# Patient Record
Sex: FEMALE | Race: WHITE | NOT HISPANIC OR LATINO
[De-identification: names, ages, dates, MRNs, and addresses within clinical notes are randomized per-mention and may not be internally consistent; named-entity substitution may affect disease eponyms.]

---

## 2018-01-18 ENCOUNTER — RESULT REVIEW (OUTPATIENT)
Age: 48
End: 2018-01-18

## 2018-01-18 ENCOUNTER — APPOINTMENT (OUTPATIENT)
Dept: DERMATOLOGY | Facility: CLINIC | Age: 48
End: 2018-01-18
Payer: COMMERCIAL

## 2018-01-18 PROCEDURE — 99214 OFFICE O/P EST MOD 30 MIN: CPT | Mod: 25

## 2018-01-18 PROCEDURE — 56605 BIOPSY OF VULVA/PERINEUM: CPT

## 2018-01-18 PROCEDURE — 11100 BX SKIN SUBCUTANEOUS&/MUCOUS MEMBRANE 1 LESION: CPT | Mod: 59

## 2019-01-09 ENCOUNTER — RESULT REVIEW (OUTPATIENT)
Age: 49
End: 2019-01-09

## 2019-01-10 ENCOUNTER — APPOINTMENT (OUTPATIENT)
Dept: DERMATOLOGY | Facility: CLINIC | Age: 49
End: 2019-01-10
Payer: COMMERCIAL

## 2019-01-10 PROCEDURE — 11103 TANGNTL BX SKIN EA SEP/ADDL: CPT

## 2019-01-10 PROCEDURE — 99214 OFFICE O/P EST MOD 30 MIN: CPT | Mod: 25

## 2019-01-10 PROCEDURE — 11106 INCAL BX SKN SINGLE LES: CPT

## 2019-01-10 PROCEDURE — 12031 INTMD RPR S/A/T/EXT 2.5 CM/<: CPT | Mod: 59

## 2020-01-09 ENCOUNTER — APPOINTMENT (OUTPATIENT)
Dept: DERMATOLOGY | Facility: CLINIC | Age: 50
End: 2020-01-09
Payer: COMMERCIAL

## 2020-01-09 PROCEDURE — 99214 OFFICE O/P EST MOD 30 MIN: CPT | Mod: 25

## 2020-01-09 PROCEDURE — 17003 DESTRUCT PREMALG LES 2-14: CPT | Mod: 59

## 2020-01-09 PROCEDURE — 17000 DESTRUCT PREMALG LESION: CPT | Mod: 59

## 2020-01-09 PROCEDURE — 17110 DESTRUCTION B9 LES UP TO 14: CPT

## 2021-01-07 ENCOUNTER — APPOINTMENT (OUTPATIENT)
Dept: DERMATOLOGY | Facility: CLINIC | Age: 51
End: 2021-01-07
Payer: COMMERCIAL

## 2021-01-07 PROCEDURE — 99214 OFFICE O/P EST MOD 30 MIN: CPT | Mod: 25

## 2021-01-07 PROCEDURE — 17000 DESTRUCT PREMALG LESION: CPT | Mod: 59

## 2021-01-07 PROCEDURE — 17110 DESTRUCTION B9 LES UP TO 14: CPT

## 2021-01-07 PROCEDURE — 99072 ADDL SUPL MATRL&STAF TM PHE: CPT

## 2021-05-10 ENCOUNTER — TRANSCRIPTION ENCOUNTER (OUTPATIENT)
Age: 51
End: 2021-05-10

## 2021-05-28 ENCOUNTER — APPOINTMENT (OUTPATIENT)
Dept: OTOLARYNGOLOGY | Facility: CLINIC | Age: 51
End: 2021-05-28
Payer: COMMERCIAL

## 2021-05-28 VITALS
SYSTOLIC BLOOD PRESSURE: 130 MMHG | DIASTOLIC BLOOD PRESSURE: 77 MMHG | HEIGHT: 68 IN | WEIGHT: 198 LBS | HEART RATE: 69 BPM | TEMPERATURE: 96.2 F | BODY MASS INDEX: 30.01 KG/M2

## 2021-05-28 DIAGNOSIS — H92.09 OTALGIA, UNSPECIFIED EAR: ICD-10-CM

## 2021-05-28 PROCEDURE — 92557 COMPREHENSIVE HEARING TEST: CPT

## 2021-05-28 PROCEDURE — 92567 TYMPANOMETRY: CPT

## 2021-05-28 PROCEDURE — 99203 OFFICE O/P NEW LOW 30 MIN: CPT

## 2021-05-28 NOTE — PHYSICAL EXAM
[de-identified] : SLIGHT TENDERNESS [Normal] : mucosa is normal [Midline] : trachea located in midline position [de-identified] : MISSING TEETH

## 2021-05-28 NOTE — REVIEW OF SYSTEMS
[Seasonal Allergies] : seasonal allergies [Dizziness] : dizziness [Vertigo] : vertigo [Lightheadedness] : lightheadedness [Problem Snoring] : problem snoring [Throat Clearing] : throat clearing [Recent Weight Loss (___ Lbs)] : recent [unfilled] ~Ulb weight loss [Eyes Itch] : itching of the eyes [Chest Pain] : chest pain [Heartburn] : heartburn [Joint Pain] : joint pain [Depression] : depression [Easy Bruising] : a tendency for easy bruising [Negative] : Endocrine [Patient Intake Form Reviewed] : Patient intake form was reviewed [FreeTextEntry3] : watery eyes  [FreeTextEntry9] : muscle aches  [de-identified] : headache  [FreeTextEntry1] : daytime sleepiness

## 2021-07-20 ENCOUNTER — EMERGENCY (EMERGENCY)
Facility: HOSPITAL | Age: 51
LOS: 1 days | Discharge: HOME | End: 2021-07-20
Attending: EMERGENCY MEDICINE | Admitting: EMERGENCY MEDICINE
Payer: COMMERCIAL

## 2021-07-20 VITALS
HEART RATE: 91 BPM | RESPIRATION RATE: 20 BRPM | SYSTOLIC BLOOD PRESSURE: 121 MMHG | WEIGHT: 194.01 LBS | HEIGHT: 68 IN | TEMPERATURE: 98 F | DIASTOLIC BLOOD PRESSURE: 68 MMHG | OXYGEN SATURATION: 95 %

## 2021-07-20 VITALS
DIASTOLIC BLOOD PRESSURE: 69 MMHG | RESPIRATION RATE: 16 BRPM | SYSTOLIC BLOOD PRESSURE: 130 MMHG | OXYGEN SATURATION: 99 % | TEMPERATURE: 98 F | HEART RATE: 76 BPM

## 2021-07-20 DIAGNOSIS — Z90.710 ACQUIRED ABSENCE OF BOTH CERVIX AND UTERUS: Chronic | ICD-10-CM

## 2021-07-20 DIAGNOSIS — Z98.890 OTHER SPECIFIED POSTPROCEDURAL STATES: Chronic | ICD-10-CM

## 2021-07-20 DIAGNOSIS — Z86.018 PERSONAL HISTORY OF OTHER BENIGN NEOPLASM: Chronic | ICD-10-CM

## 2021-07-20 DIAGNOSIS — C51.9 MALIGNANT NEOPLASM OF VULVA, UNSPECIFIED: Chronic | ICD-10-CM

## 2021-07-20 LAB
ALBUMIN SERPL ELPH-MCNC: 3.9 G/DL — SIGNIFICANT CHANGE UP (ref 3.3–5)
ALP SERPL-CCNC: 127 U/L — HIGH (ref 30–120)
ALT FLD-CCNC: 36 U/L DA — SIGNIFICANT CHANGE UP (ref 10–60)
ANION GAP SERPL CALC-SCNC: 7 MMOL/L — SIGNIFICANT CHANGE UP (ref 5–17)
APPEARANCE UR: CLEAR — SIGNIFICANT CHANGE UP
AST SERPL-CCNC: 23 U/L — SIGNIFICANT CHANGE UP (ref 10–40)
BASOPHILS # BLD AUTO: 0.07 K/UL — SIGNIFICANT CHANGE UP (ref 0–0.2)
BASOPHILS NFR BLD AUTO: 0.7 % — SIGNIFICANT CHANGE UP (ref 0–2)
BILIRUB SERPL-MCNC: 0.3 MG/DL — SIGNIFICANT CHANGE UP (ref 0.2–1.2)
BILIRUB UR-MCNC: NEGATIVE — SIGNIFICANT CHANGE UP
BUN SERPL-MCNC: 11 MG/DL — SIGNIFICANT CHANGE UP (ref 7–23)
CALCIUM SERPL-MCNC: 10.2 MG/DL — SIGNIFICANT CHANGE UP (ref 8.4–10.5)
CHLORIDE SERPL-SCNC: 102 MMOL/L — SIGNIFICANT CHANGE UP (ref 96–108)
CO2 SERPL-SCNC: 31 MMOL/L — SIGNIFICANT CHANGE UP (ref 22–31)
COLOR SPEC: YELLOW — SIGNIFICANT CHANGE UP
CREAT SERPL-MCNC: 1.17 MG/DL — SIGNIFICANT CHANGE UP (ref 0.5–1.3)
DIFF PNL FLD: ABNORMAL
EOSINOPHIL # BLD AUTO: 0.13 K/UL — SIGNIFICANT CHANGE UP (ref 0–0.5)
EOSINOPHIL NFR BLD AUTO: 1.3 % — SIGNIFICANT CHANGE UP (ref 0–6)
GLUCOSE SERPL-MCNC: 91 MG/DL — SIGNIFICANT CHANGE UP (ref 70–99)
GLUCOSE UR QL: NEGATIVE MG/DL — SIGNIFICANT CHANGE UP
HCT VFR BLD CALC: 37.3 % — SIGNIFICANT CHANGE UP (ref 34.5–45)
HGB BLD-MCNC: 12.4 G/DL — SIGNIFICANT CHANGE UP (ref 11.5–15.5)
IMM GRANULOCYTES NFR BLD AUTO: 0.2 % — SIGNIFICANT CHANGE UP (ref 0–1.5)
KETONES UR-MCNC: ABNORMAL
LEUKOCYTE ESTERASE UR-ACNC: ABNORMAL
LIDOCAIN IGE QN: 135 U/L — SIGNIFICANT CHANGE UP (ref 73–393)
LYMPHOCYTES # BLD AUTO: 2.87 K/UL — SIGNIFICANT CHANGE UP (ref 1–3.3)
LYMPHOCYTES # BLD AUTO: 28.6 % — SIGNIFICANT CHANGE UP (ref 13–44)
MCHC RBC-ENTMCNC: 29.2 PG — SIGNIFICANT CHANGE UP (ref 27–34)
MCHC RBC-ENTMCNC: 33.2 GM/DL — SIGNIFICANT CHANGE UP (ref 32–36)
MCV RBC AUTO: 88 FL — SIGNIFICANT CHANGE UP (ref 80–100)
MONOCYTES # BLD AUTO: 0.89 K/UL — SIGNIFICANT CHANGE UP (ref 0–0.9)
MONOCYTES NFR BLD AUTO: 8.9 % — SIGNIFICANT CHANGE UP (ref 2–14)
NEUTROPHILS # BLD AUTO: 6.05 K/UL — SIGNIFICANT CHANGE UP (ref 1.8–7.4)
NEUTROPHILS NFR BLD AUTO: 60.3 % — SIGNIFICANT CHANGE UP (ref 43–77)
NITRITE UR-MCNC: NEGATIVE — SIGNIFICANT CHANGE UP
NRBC # BLD: 0 /100 WBCS — SIGNIFICANT CHANGE UP (ref 0–0)
PH UR: 5 — SIGNIFICANT CHANGE UP (ref 5–8)
PLATELET # BLD AUTO: 317 K/UL — SIGNIFICANT CHANGE UP (ref 150–400)
POTASSIUM SERPL-MCNC: 3.5 MMOL/L — SIGNIFICANT CHANGE UP (ref 3.5–5.3)
POTASSIUM SERPL-SCNC: 3.5 MMOL/L — SIGNIFICANT CHANGE UP (ref 3.5–5.3)
PROT SERPL-MCNC: 7.7 G/DL — SIGNIFICANT CHANGE UP (ref 6–8.3)
PROT UR-MCNC: 15 MG/DL
RBC # BLD: 4.24 M/UL — SIGNIFICANT CHANGE UP (ref 3.8–5.2)
RBC # FLD: 12.1 % — SIGNIFICANT CHANGE UP (ref 10.3–14.5)
SODIUM SERPL-SCNC: 140 MMOL/L — SIGNIFICANT CHANGE UP (ref 135–145)
SP GR SPEC: 1.02 — SIGNIFICANT CHANGE UP (ref 1.01–1.02)
UROBILINOGEN FLD QL: 4 MG/DL
WBC # BLD: 10.03 K/UL — SIGNIFICANT CHANGE UP (ref 3.8–10.5)
WBC # FLD AUTO: 10.03 K/UL — SIGNIFICANT CHANGE UP (ref 3.8–10.5)

## 2021-07-20 PROCEDURE — 74177 CT ABD & PELVIS W/CONTRAST: CPT | Mod: 26,ME

## 2021-07-20 PROCEDURE — 74177 CT ABD & PELVIS W/CONTRAST: CPT

## 2021-07-20 PROCEDURE — 80053 COMPREHEN METABOLIC PANEL: CPT

## 2021-07-20 PROCEDURE — 81001 URINALYSIS AUTO W/SCOPE: CPT

## 2021-07-20 PROCEDURE — 96360 HYDRATION IV INFUSION INIT: CPT | Mod: XU

## 2021-07-20 PROCEDURE — 36415 COLL VENOUS BLD VENIPUNCTURE: CPT

## 2021-07-20 PROCEDURE — 99284 EMERGENCY DEPT VISIT MOD MDM: CPT | Mod: 25

## 2021-07-20 PROCEDURE — 83690 ASSAY OF LIPASE: CPT

## 2021-07-20 PROCEDURE — G1004: CPT

## 2021-07-20 PROCEDURE — 85025 COMPLETE CBC W/AUTO DIFF WBC: CPT

## 2021-07-20 PROCEDURE — 99284 EMERGENCY DEPT VISIT MOD MDM: CPT

## 2021-07-20 RX ORDER — SODIUM CHLORIDE 9 MG/ML
1000 INJECTION INTRAMUSCULAR; INTRAVENOUS; SUBCUTANEOUS ONCE
Refills: 0 | Status: COMPLETED | OUTPATIENT
Start: 2021-07-20 | End: 2021-07-20

## 2021-07-20 RX ADMIN — SODIUM CHLORIDE 1000 MILLILITER(S): 9 INJECTION INTRAMUSCULAR; INTRAVENOUS; SUBCUTANEOUS at 21:05

## 2021-07-20 RX ADMIN — SODIUM CHLORIDE 1000 MILLILITER(S): 9 INJECTION INTRAMUSCULAR; INTRAVENOUS; SUBCUTANEOUS at 19:55

## 2021-07-20 NOTE — ED PROVIDER NOTE - NSFOLLOWUPINSTRUCTIONS_ED_ALL_ED_FT
Duodenitis    WHAT YOU NEED TO KNOW:    Duodenitis is inflammation or irritation of the duodenum. The duodenum is the first part of the small intestine, just below your stomach.    Abdominal Organs         DISCHARGE INSTRUCTIONS:    Return to the emergency department if:   •You have severe abdominal pain.      •You have bloody or black, tarry bowel movements or vomit.      Call your doctor if:   •You have new or worsening symptoms, even after treatment.      •You have questions or concerns about your condition or care.      Medicines:   •Medicines may be given to treat an infection or to control stomach acid.      •Take your medicine as directed. Contact your healthcare provider if you think your medicine is not helping or if you have side effects. Tell him of her if you are allergic to any medicine. Keep a list of the medicines, vitamins, and herbs you take. Include the amounts, and when and why you take them. Bring the list or the pill bottles to follow-up visits. Carry your medicine list with you in case of an emergency.      Follow up with your healthcare provider as directed: You may need ongoing tests or treatment. Write down your questions so you remember to ask them during your visits.    Do not smoke: Nicotine and other chemicals in cigarettes and cigars can cause blood vessel and lung damage. Ask your healthcare provider for information if you currently smoke and need help to quit. E-cigarettes or smokeless tobacco still contain nicotine. Talk to your healthcare provider before you use these products.    Limit or do not drink alcohol: Alcohol can make your duodenitis worse. Talk to your healthcare provider if you need help to stop drinking.    Keep batteries and similar objects out of the reach of children: Babies often put items in their mouths to explore them. Button batteries are easy to swallow and can cause serious damage. Keep the battery covers of electronic devices such as remote controls taped closed. Store all batteries and toxic materials where children cannot get to them. Use childproof locks to keep children away from dangerous materials.    Manage or prevent duodenitis:   •Do not take NSAIDs or aspirin unless directed. These and similar medicines can cause irritation. It may help to take NSAIDs with food, but you may not be able to take them at all.      •Do not eat foods that cause irritation. Foods such as oranges and salsa can cause burning or pain. Eat a variety of healthy foods. Examples include fruits (not citrus), vegetables, low-fat dairy products, beans, whole-grain breads, and lean meats and fish. Try to eat small meals, and drink water with your meals. Do not eat for at least 3 hours before you go to bed.  Healthy Foods              © Copyright [employer] 2021

## 2021-07-20 NOTE — ED ADULT NURSE NOTE - NSIMPLEMENTINTERV_GEN_ALL_ED
Implemented All Universal Safety Interventions:  [place] to call system. Call bell, personal items and telephone within reach. Instruct patient to call for assistance. Room bathroom lighting operational. Non-slip footwear when patient is off stretcher. Physically safe environment: no spills, clutter or unnecessary equipment. Stretcher in lowest position, wheels locked, appropriate side rails in place.

## 2021-07-20 NOTE — ED ADULT NURSE NOTE - PMH
GERD (gastroesophageal reflux disease)    Hyperlipidemia    PTSD (post-traumatic stress disorder)

## 2021-07-20 NOTE — ED ADULT NURSE NOTE - PSH
3
H/O abdominal hysterectomy  partial  History of lumpectomy of right breast    History of uterine fibroid  removal  Vulvar cancer

## 2021-07-20 NOTE — ED ADULT NURSE NOTE - ABDOMEN
GOAL: Pt will increase global strength in order to improve function mobility within 2 weeks.
soft/nondistended/tender

## 2021-07-20 NOTE — ED ADULT NURSE NOTE - OBJECTIVE STATEMENT
Patient presents c/o left abdominal pains for 3 days described as soreness. The pain increases with deep inspiration Patient presents c/o left abdominal pains for 3 days described as soreness. The pain increases with deep inspiration. States decreased appetite, denies any nausea or vomiting but has experienced a few episodes of diarrhea today.

## 2021-07-20 NOTE — ED PROVIDER NOTE - PATIENT PORTAL LINK FT
You can access the FollowMyHealth Patient Portal offered by [facility] by registering at the following website: [email or web address]. By joining [employer]s FollowMyHealth portal, you will also be able to view your health information using other applications (apps) compatible with our system.

## 2021-07-20 NOTE — ED PROVIDER NOTE - OBJECTIVE STATEMENT
50yo female who presents with abd pain for 4 days. pt c/o LLQ pain, constant non radiating no nausea or vomiting no fever, chills, pt did not take anything for the pain

## 2021-09-01 ENCOUNTER — RESULT REVIEW (OUTPATIENT)
Age: 51
End: 2021-09-01

## 2021-09-12 ENCOUNTER — APPOINTMENT (OUTPATIENT)
Dept: DISASTER EMERGENCY | Facility: CLINIC | Age: 51
End: 2021-09-12

## 2021-09-12 DIAGNOSIS — Z01.818 ENCOUNTER FOR OTHER PREPROCEDURAL EXAMINATION: ICD-10-CM

## 2021-09-12 PROBLEM — E78.5 HYPERLIPIDEMIA, UNSPECIFIED: Chronic | Status: ACTIVE | Noted: 2021-07-20

## 2021-09-12 PROBLEM — K21.9 GASTRO-ESOPHAGEAL REFLUX DISEASE WITHOUT ESOPHAGITIS: Chronic | Status: ACTIVE | Noted: 2021-07-20

## 2021-09-12 PROBLEM — F43.10 POST-TRAUMATIC STRESS DISORDER, UNSPECIFIED: Chronic | Status: ACTIVE | Noted: 2021-07-20

## 2021-09-13 LAB — SARS-COV-2 N GENE NPH QL NAA+PROBE: NOT DETECTED

## 2021-11-30 ENCOUNTER — APPOINTMENT (OUTPATIENT)
Dept: PULMONOLOGY | Facility: CLINIC | Age: 51
End: 2021-11-30
Payer: COMMERCIAL

## 2021-11-30 VITALS
HEART RATE: 79 BPM | OXYGEN SATURATION: 98 % | WEIGHT: 198 LBS | HEIGHT: 68 IN | SYSTOLIC BLOOD PRESSURE: 129 MMHG | DIASTOLIC BLOOD PRESSURE: 76 MMHG | BODY MASS INDEX: 30.01 KG/M2 | RESPIRATION RATE: 16 BRPM

## 2021-11-30 DIAGNOSIS — I73.00 RAYNAUD'S SYNDROME W/OUT GANGRENE: ICD-10-CM

## 2021-11-30 DIAGNOSIS — Z87.891 PERSONAL HISTORY OF NICOTINE DEPENDENCE: ICD-10-CM

## 2021-11-30 DIAGNOSIS — Z87.39 PERSONAL HISTORY OF OTHER DISEASES OF THE MUSCULOSKELETAL SYSTEM AND CONNECTIVE TISSUE: ICD-10-CM

## 2021-11-30 DIAGNOSIS — F51.5 NIGHTMARE DISORDER: ICD-10-CM

## 2021-11-30 PROCEDURE — 99204 OFFICE O/P NEW MOD 45 MIN: CPT

## 2021-11-30 NOTE — PHYSICAL EXAM
[No Acute Distress] : no acute distress [Low Lying Soft Palate] : low lying soft palate [Enlarged Base of the Tongue] : enlarged base of the tongue [Normal Appearance] : normal appearance [Neck Circumference: ___] : neck circumference: [unfilled] [No Neck Mass] : no neck mass [III] : Mallampati Class: III [TextBox_2] : obese

## 2021-11-30 NOTE — DISCUSSION/SUMMARY
[FreeTextEntry1] : CHRISTINE\par PTSD related nightmares\par Obesity\par Note : poor sleep makes it likely HST would underestimate CHRISTINE\par            Alpha blocker excludes WatchPat study  \par            TMJ makes oral appliance option less tenable \par PST is the most appropriate test

## 2021-11-30 NOTE — HISTORY OF PRESENT ILLNESS
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

## 2021-11-30 NOTE — CONSULT LETTER
[Dear  ___] : Dear  [unfilled], [Consult Letter:] : I had the pleasure of evaluating your patient, [unfilled]. [Please see my note below.] : Please see my note below. [Consult Closing:] : Thank you very much for allowing me to participate in the care of this patient.  If you have any questions, please do not hesitate to contact me. [DrKike  ___] : Dr. LAROSE

## 2021-12-10 ENCOUNTER — OUTPATIENT (OUTPATIENT)
Dept: OUTPATIENT SERVICES | Facility: HOSPITAL | Age: 51
LOS: 1 days | End: 2021-12-10
Payer: COMMERCIAL

## 2021-12-10 ENCOUNTER — APPOINTMENT (OUTPATIENT)
Age: 51
End: 2021-12-10
Payer: COMMERCIAL

## 2021-12-10 DIAGNOSIS — Z98.890 OTHER SPECIFIED POSTPROCEDURAL STATES: Chronic | ICD-10-CM

## 2021-12-10 DIAGNOSIS — G47.33 OBSTRUCTIVE SLEEP APNEA (ADULT) (PEDIATRIC): ICD-10-CM

## 2021-12-10 DIAGNOSIS — Z86.018 PERSONAL HISTORY OF OTHER BENIGN NEOPLASM: Chronic | ICD-10-CM

## 2021-12-10 DIAGNOSIS — C51.9 MALIGNANT NEOPLASM OF VULVA, UNSPECIFIED: Chronic | ICD-10-CM

## 2021-12-10 DIAGNOSIS — Z90.710 ACQUIRED ABSENCE OF BOTH CERVIX AND UTERUS: Chronic | ICD-10-CM

## 2021-12-10 PROCEDURE — ZZZZZ: CPT

## 2021-12-10 PROCEDURE — 95810 POLYSOM 6/> YRS 4/> PARAM: CPT | Mod: 26

## 2021-12-10 PROCEDURE — 95810 POLYSOM 6/> YRS 4/> PARAM: CPT

## 2021-12-22 ENCOUNTER — APPOINTMENT (OUTPATIENT)
Dept: PULMONOLOGY | Facility: CLINIC | Age: 51
End: 2021-12-22
Payer: COMMERCIAL

## 2021-12-22 VITALS
OXYGEN SATURATION: 95 % | HEART RATE: 77 BPM | RESPIRATION RATE: 16 BRPM | BODY MASS INDEX: 29.95 KG/M2 | WEIGHT: 197 LBS | SYSTOLIC BLOOD PRESSURE: 130 MMHG | DIASTOLIC BLOOD PRESSURE: 70 MMHG

## 2021-12-22 DIAGNOSIS — R06.83 SNORING: ICD-10-CM

## 2021-12-22 DIAGNOSIS — G47.33 OBSTRUCTIVE SLEEP APNEA (ADULT) (PEDIATRIC): ICD-10-CM

## 2021-12-22 DIAGNOSIS — M26.609 UNSPECIFIED TEMPOROMANDIBULAR JOINT DISORDER: ICD-10-CM

## 2021-12-22 DIAGNOSIS — G47.19 OTHER HYPERSOMNIA: ICD-10-CM

## 2021-12-22 PROCEDURE — 99213 OFFICE O/P EST LOW 20 MIN: CPT

## 2021-12-22 RX ORDER — IBUPROFEN 800 MG/1
TABLET, FILM COATED ORAL
Refills: 0 | Status: ACTIVE | COMMUNITY

## 2021-12-22 RX ORDER — ALPRAZOLAM 2 MG/1
TABLET ORAL
Refills: 0 | Status: ACTIVE | COMMUNITY

## 2021-12-22 RX ORDER — ESCITALOPRAM OXALATE 5 MG/1
TABLET, FILM COATED ORAL
Refills: 0 | Status: ACTIVE | COMMUNITY

## 2021-12-22 RX ORDER — PRAZOSIN HYDROCHLORIDE 5 MG/1
5 CAPSULE ORAL
Refills: 0 | Status: ACTIVE | COMMUNITY

## 2021-12-22 NOTE — PHYSICAL EXAM
[No Acute Distress] : no acute distress [Low Lying Soft Palate] : low lying soft palate [Enlarged Base of the Tongue] : enlarged base of the tongue [III] : Mallampati Class: III [Normal Appearance] : normal appearance [Neck Circumference: ___] : neck circumference: [unfilled] [No Neck Mass] : no neck mass [TextBox_2] : obese

## 2021-12-22 NOTE — DISCUSSION/SUMMARY
[FreeTextEntry1] : CHRISTINE\par PTSD  related nightmares\par Arousal from nightmares with increased resultant anxiety due to CHRISTINE related arousal\par Obesity\par

## 2021-12-22 NOTE — HISTORY OF PRESENT ILLNESS
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

## 2022-01-06 ENCOUNTER — APPOINTMENT (OUTPATIENT)
Dept: DERMATOLOGY | Facility: CLINIC | Age: 52
End: 2022-01-06
Payer: COMMERCIAL

## 2022-01-06 PROCEDURE — 17110 DESTRUCTION B9 LES UP TO 14: CPT

## 2022-01-06 PROCEDURE — 99214 OFFICE O/P EST MOD 30 MIN: CPT | Mod: 25

## 2022-01-27 ENCOUNTER — APPOINTMENT (OUTPATIENT)
Dept: DERMATOLOGY | Facility: CLINIC | Age: 52
End: 2022-01-27
Payer: COMMERCIAL

## 2022-01-27 PROCEDURE — 99212 OFFICE O/P EST SF 10 MIN: CPT | Mod: 25

## 2022-01-27 PROCEDURE — 17110 DESTRUCTION B9 LES UP TO 14: CPT

## 2022-02-17 ENCOUNTER — APPOINTMENT (OUTPATIENT)
Dept: DERMATOLOGY | Facility: CLINIC | Age: 52
End: 2022-02-17

## 2022-03-08 ENCOUNTER — APPOINTMENT (OUTPATIENT)
Dept: DERMATOLOGY | Facility: CLINIC | Age: 52
End: 2022-03-08

## 2022-03-17 ENCOUNTER — TRANSCRIPTION ENCOUNTER (OUTPATIENT)
Age: 52
End: 2022-03-17

## 2022-04-28 ENCOUNTER — APPOINTMENT (OUTPATIENT)
Dept: DERMATOLOGY | Facility: CLINIC | Age: 52
End: 2022-04-28
Payer: COMMERCIAL

## 2022-04-28 PROCEDURE — 99213 OFFICE O/P EST LOW 20 MIN: CPT

## 2022-05-02 ENCOUNTER — EMERGENCY (EMERGENCY)
Facility: HOSPITAL | Age: 52
LOS: 1 days | Discharge: HOME | End: 2022-05-02
Attending: EMERGENCY MEDICINE | Admitting: EMERGENCY MEDICINE
Payer: COMMERCIAL

## 2022-05-02 VITALS
OXYGEN SATURATION: 97 % | TEMPERATURE: 100 F | RESPIRATION RATE: 16 BRPM | DIASTOLIC BLOOD PRESSURE: 78 MMHG | SYSTOLIC BLOOD PRESSURE: 143 MMHG | HEART RATE: 102 BPM | WEIGHT: 213.85 LBS | HEIGHT: 68 IN

## 2022-05-02 DIAGNOSIS — Z98.890 OTHER SPECIFIED POSTPROCEDURAL STATES: Chronic | ICD-10-CM

## 2022-05-02 DIAGNOSIS — Z86.018 PERSONAL HISTORY OF OTHER BENIGN NEOPLASM: Chronic | ICD-10-CM

## 2022-05-02 DIAGNOSIS — Z90.710 ACQUIRED ABSENCE OF BOTH CERVIX AND UTERUS: Chronic | ICD-10-CM

## 2022-05-02 DIAGNOSIS — C51.9 MALIGNANT NEOPLASM OF VULVA, UNSPECIFIED: Chronic | ICD-10-CM

## 2022-05-02 LAB
ALBUMIN SERPL ELPH-MCNC: 3.4 G/DL — SIGNIFICANT CHANGE UP (ref 3.3–5)
ALP SERPL-CCNC: 125 U/L — HIGH (ref 30–120)
ALT FLD-CCNC: 45 U/L DA — SIGNIFICANT CHANGE UP (ref 10–60)
ANION GAP SERPL CALC-SCNC: 9 MMOL/L — SIGNIFICANT CHANGE UP (ref 5–17)
APPEARANCE UR: CLEAR — SIGNIFICANT CHANGE UP
APTT BLD: 29.7 SEC — SIGNIFICANT CHANGE UP (ref 27.5–35.5)
AST SERPL-CCNC: 26 U/L — SIGNIFICANT CHANGE UP (ref 10–40)
BACTERIA # UR AUTO: ABNORMAL
BASOPHILS # BLD AUTO: 0.03 K/UL — SIGNIFICANT CHANGE UP (ref 0–0.2)
BASOPHILS NFR BLD AUTO: 0.3 % — SIGNIFICANT CHANGE UP (ref 0–2)
BILIRUB SERPL-MCNC: 0.5 MG/DL — SIGNIFICANT CHANGE UP (ref 0.2–1.2)
BILIRUB UR-MCNC: NEGATIVE — SIGNIFICANT CHANGE UP
BUN SERPL-MCNC: 11 MG/DL — SIGNIFICANT CHANGE UP (ref 7–23)
CALCIUM SERPL-MCNC: 8.6 MG/DL — SIGNIFICANT CHANGE UP (ref 8.4–10.5)
CHLORIDE SERPL-SCNC: 99 MMOL/L — SIGNIFICANT CHANGE UP (ref 96–108)
CO2 SERPL-SCNC: 25 MMOL/L — SIGNIFICANT CHANGE UP (ref 22–31)
COLOR SPEC: YELLOW — SIGNIFICANT CHANGE UP
CREAT SERPL-MCNC: 1.04 MG/DL — SIGNIFICANT CHANGE UP (ref 0.5–1.3)
DIFF PNL FLD: ABNORMAL
EGFR: 65 ML/MIN/1.73M2 — SIGNIFICANT CHANGE UP
EOSINOPHIL # BLD AUTO: 0.02 K/UL — SIGNIFICANT CHANGE UP (ref 0–0.5)
EOSINOPHIL NFR BLD AUTO: 0.2 % — SIGNIFICANT CHANGE UP (ref 0–6)
EPI CELLS # UR: ABNORMAL
FLUAV AG NPH QL: SIGNIFICANT CHANGE UP
FLUBV AG NPH QL: SIGNIFICANT CHANGE UP
GLUCOSE SERPL-MCNC: 126 MG/DL — HIGH (ref 70–99)
GLUCOSE UR QL: NEGATIVE MG/DL — SIGNIFICANT CHANGE UP
HCT VFR BLD CALC: 36.8 % — SIGNIFICANT CHANGE UP (ref 34.5–45)
HGB BLD-MCNC: 12.2 G/DL — SIGNIFICANT CHANGE UP (ref 11.5–15.5)
IMM GRANULOCYTES NFR BLD AUTO: 0.2 % — SIGNIFICANT CHANGE UP (ref 0–1.5)
INR BLD: 1.06 RATIO — SIGNIFICANT CHANGE UP (ref 0.88–1.16)
KETONES UR-MCNC: NEGATIVE — SIGNIFICANT CHANGE UP
LACTATE SERPL-SCNC: 2.2 MMOL/L — HIGH (ref 0.7–2)
LACTATE SERPL-SCNC: 2.9 MMOL/L — HIGH (ref 0.7–2)
LEUKOCYTE ESTERASE UR-ACNC: NEGATIVE — SIGNIFICANT CHANGE UP
LYMPHOCYTES # BLD AUTO: 0.44 K/UL — LOW (ref 1–3.3)
LYMPHOCYTES # BLD AUTO: 4.2 % — LOW (ref 13–44)
MCHC RBC-ENTMCNC: 29 PG — SIGNIFICANT CHANGE UP (ref 27–34)
MCHC RBC-ENTMCNC: 33.2 GM/DL — SIGNIFICANT CHANGE UP (ref 32–36)
MCV RBC AUTO: 87.6 FL — SIGNIFICANT CHANGE UP (ref 80–100)
MONOCYTES # BLD AUTO: 0.37 K/UL — SIGNIFICANT CHANGE UP (ref 0–0.9)
MONOCYTES NFR BLD AUTO: 3.5 % — SIGNIFICANT CHANGE UP (ref 2–14)
NEUTROPHILS # BLD AUTO: 9.59 K/UL — HIGH (ref 1.8–7.4)
NEUTROPHILS NFR BLD AUTO: 91.6 % — HIGH (ref 43–77)
NITRITE UR-MCNC: NEGATIVE — SIGNIFICANT CHANGE UP
NRBC # BLD: 0 /100 WBCS — SIGNIFICANT CHANGE UP (ref 0–0)
PH UR: 5 — SIGNIFICANT CHANGE UP (ref 5–8)
PLATELET # BLD AUTO: 292 K/UL — SIGNIFICANT CHANGE UP (ref 150–400)
POTASSIUM SERPL-MCNC: 3.3 MMOL/L — LOW (ref 3.5–5.3)
POTASSIUM SERPL-SCNC: 3.3 MMOL/L — LOW (ref 3.5–5.3)
PROT SERPL-MCNC: 7.3 G/DL — SIGNIFICANT CHANGE UP (ref 6–8.3)
PROT UR-MCNC: NEGATIVE MG/DL — SIGNIFICANT CHANGE UP
PROTHROM AB SERPL-ACNC: 12.2 SEC — SIGNIFICANT CHANGE UP (ref 10.5–13.4)
RBC # BLD: 4.2 M/UL — SIGNIFICANT CHANGE UP (ref 3.8–5.2)
RBC # FLD: 12.4 % — SIGNIFICANT CHANGE UP (ref 10.3–14.5)
RBC CASTS # UR COMP ASSIST: SIGNIFICANT CHANGE UP /HPF (ref 0–4)
RSV RNA NPH QL NAA+NON-PROBE: SIGNIFICANT CHANGE UP
SARS-COV-2 RNA SPEC QL NAA+PROBE: SIGNIFICANT CHANGE UP
SODIUM SERPL-SCNC: 133 MMOL/L — LOW (ref 135–145)
SP GR SPEC: 1.01 — SIGNIFICANT CHANGE UP (ref 1.01–1.02)
UROBILINOGEN FLD QL: NEGATIVE MG/DL — SIGNIFICANT CHANGE UP
WBC # BLD: 10.47 K/UL — SIGNIFICANT CHANGE UP (ref 3.8–10.5)
WBC # FLD AUTO: 10.47 K/UL — SIGNIFICANT CHANGE UP (ref 3.8–10.5)
WBC UR QL: SIGNIFICANT CHANGE UP

## 2022-05-02 PROCEDURE — 74177 CT ABD & PELVIS W/CONTRAST: CPT | Mod: 26,MA

## 2022-05-02 PROCEDURE — 99285 EMERGENCY DEPT VISIT HI MDM: CPT

## 2022-05-02 PROCEDURE — 71046 X-RAY EXAM CHEST 2 VIEWS: CPT | Mod: 26

## 2022-05-02 RX ORDER — ALPRAZOLAM 0.25 MG
1 TABLET ORAL
Qty: 0 | Refills: 0 | DISCHARGE

## 2022-05-02 RX ORDER — PRAZOSIN HCL 2 MG
3 CAPSULE ORAL
Qty: 0 | Refills: 0 | DISCHARGE

## 2022-05-02 RX ORDER — TOPIRAMATE 25 MG
1 TABLET ORAL
Qty: 0 | Refills: 0 | DISCHARGE

## 2022-05-02 RX ORDER — OMEPRAZOLE 10 MG/1
1 CAPSULE, DELAYED RELEASE ORAL
Qty: 0 | Refills: 0 | DISCHARGE

## 2022-05-02 RX ORDER — LANOLIN ALCOHOL/MO/W.PET/CERES
1 CREAM (GRAM) TOPICAL
Qty: 0 | Refills: 0 | DISCHARGE

## 2022-05-02 RX ORDER — LOVASTATIN 20 MG
1 TABLET ORAL
Qty: 0 | Refills: 0 | DISCHARGE

## 2022-05-02 RX ORDER — KETOROLAC TROMETHAMINE 30 MG/ML
30 SYRINGE (ML) INJECTION ONCE
Refills: 0 | Status: DISCONTINUED | OUTPATIENT
Start: 2022-05-02 | End: 2022-05-02

## 2022-05-02 RX ORDER — POTASSIUM CHLORIDE 20 MEQ
40 PACKET (EA) ORAL ONCE
Refills: 0 | Status: COMPLETED | OUTPATIENT
Start: 2022-05-02 | End: 2022-05-02

## 2022-05-02 RX ORDER — IBUPROFEN 200 MG
1 TABLET ORAL
Qty: 0 | Refills: 0 | DISCHARGE

## 2022-05-02 RX ORDER — PRAZOSIN HCL 2 MG
2 CAPSULE ORAL
Qty: 0 | Refills: 0 | DISCHARGE

## 2022-05-02 RX ORDER — ESCITALOPRAM OXALATE 10 MG/1
1 TABLET, FILM COATED ORAL
Qty: 0 | Refills: 0 | DISCHARGE

## 2022-05-02 RX ORDER — SODIUM CHLORIDE 9 MG/ML
2000 INJECTION INTRAMUSCULAR; INTRAVENOUS; SUBCUTANEOUS ONCE
Refills: 0 | Status: COMPLETED | OUTPATIENT
Start: 2022-05-02 | End: 2022-05-02

## 2022-05-02 RX ORDER — PHENTERMINE HCL 30 MG
1 CAPSULE ORAL
Qty: 0 | Refills: 0 | DISCHARGE

## 2022-05-02 RX ORDER — SODIUM CHLORIDE 9 MG/ML
1000 INJECTION INTRAMUSCULAR; INTRAVENOUS; SUBCUTANEOUS ONCE
Refills: 0 | Status: COMPLETED | OUTPATIENT
Start: 2022-05-02 | End: 2022-05-02

## 2022-05-02 RX ORDER — ATORVASTATIN CALCIUM 80 MG/1
1 TABLET, FILM COATED ORAL
Qty: 0 | Refills: 0 | DISCHARGE

## 2022-05-02 RX ADMIN — Medication 30 MILLIGRAM(S): at 22:39

## 2022-05-02 RX ADMIN — Medication 40 MILLIEQUIVALENT(S): at 20:53

## 2022-05-02 RX ADMIN — SODIUM CHLORIDE 1000 MILLILITER(S): 9 INJECTION INTRAMUSCULAR; INTRAVENOUS; SUBCUTANEOUS at 22:10

## 2022-05-02 RX ADMIN — Medication 30 MILLIGRAM(S): at 23:20

## 2022-05-02 RX ADMIN — SODIUM CHLORIDE 2000 MILLILITER(S): 9 INJECTION INTRAMUSCULAR; INTRAVENOUS; SUBCUTANEOUS at 22:00

## 2022-05-02 RX ADMIN — SODIUM CHLORIDE 2000 MILLILITER(S): 9 INJECTION INTRAMUSCULAR; INTRAVENOUS; SUBCUTANEOUS at 21:00

## 2022-05-02 RX ADMIN — SODIUM CHLORIDE 1000 MILLILITER(S): 9 INJECTION INTRAMUSCULAR; INTRAVENOUS; SUBCUTANEOUS at 23:10

## 2022-05-02 NOTE — ED PROVIDER NOTE - NSICDXPASTSURGICALHX_GEN_ALL_CORE_FT
PAST SURGICAL HISTORY:  H/O abdominal hysterectomy partial    History of lumpectomy of right breast     History of uterine fibroid removal    Vulvar cancer

## 2022-05-02 NOTE — ED PROVIDER NOTE - NSICDXPASTMEDICALHX_GEN_ALL_CORE_FT
PAST MEDICAL HISTORY:  GERD (gastroesophageal reflux disease)     Hyperlipidemia     PTSD (post-traumatic stress disorder)

## 2022-05-02 NOTE — ED ADULT NURSE NOTE - OBJECTIVE STATEMENT
52yo female walked into ED, pt c/o abd pain along with nausea and vomiting. pt also c/o low grade fever.

## 2022-05-02 NOTE — ED PROVIDER NOTE - NS_ ATTENDINGSCRIBEDETAILS _ED_A_ED_FT
Santos Brock MD - The scribe's documentation has been prepared under my direction and personally reviewed by me in its entirety. I confirm that the note above accurately reflects all work, treatment, procedures, and medical decision making performed by me.

## 2022-05-02 NOTE — ED PROVIDER NOTE - CLINICAL SUMMARY MEDICAL DECISION MAKING FREE TEXT BOX
Pt w/ febrile illness w/ some GI symptoms and tenderness in LLQ. Plan is labs, swab for virus and get CT of abd.

## 2022-05-02 NOTE — ED PROVIDER NOTE - OBJECTIVE STATEMENT
52 y/o F w/ PMHx of HLD, GERD, PTSD, presents to ED c/o vomiting, diarrhea, body aches, fever. Pt state she has been vomiting and w/ diarrhea no abd pain but has had some body aches and few hours ago developed fever w/ t-max of 100.9 at home. Pt reports mild cough but no sore throat, runny nose but has been sneezing, no urinary complaints. No known sick contacts, no travel. Pt also reports she fell 2 days ago and hit her mid back has had soreness to that area since.

## 2022-05-02 NOTE — ED ADULT NURSE REASSESSMENT NOTE - NSIMPLEMENTINTERV_GEN_ALL_ED
Implemented All Fall Risk Interventions:  [place] to call system. Call bell, personal items and telephone within reach. Instruct patient to call for assistance. Room bathroom lighting operational. Non-slip footwear when patient is off stretcher. Physically safe environment: no spills, clutter or unnecessary equipment. Stretcher in lowest position, wheels locked, appropriate side rails in place. Provide visual cue, wrist band, yellow gown, etc. Monitor gait and stability. Monitor for mental status changes and reorient to person, place, and time. Review medications for side effects contributing to fall risk. Reinforce activity limits and safety measures with patient and family.

## 2022-05-02 NOTE — ED ADULT TRIAGE NOTE - CHIEF COMPLAINT QUOTE
" Two days ago I fell hit my upper back on a bench - My back was very painful. Today I woke up with diarrhea , Im vomiting feels very achy and I have fever. " Pt denies any recent close contact with individuals  (+) for Covid infection.

## 2022-05-02 NOTE — ED PROVIDER NOTE - NSFOLLOWUPINSTRUCTIONS_ED_ALL_ED_FT
Viral Illness, Adult      Viruses are tiny germs that can get into a person's body and cause illness. There are many different types of viruses, and they cause many types of illness. Viral illnesses can range from mild to severe. They can affect various parts of the body.    Short-term conditions that are caused by a virus include colds and the flu (influenza). Long-term conditions that are caused by a virus include herpes, shingles, and HIV (human immunodeficiency virus) infection. A few viruses have been linked to certain cancers.      What are the causes?    Many types of viruses can cause illness. Viruses invade cells in your body, multiply, and cause the infected cells to work abnormally or die. When these cells die, they release more of the virus. When this happens, you develop symptoms of the illness, and the virus continues to spread to other cells. If the virus takes over the function of the cell, it can cause the cell to divide and grow out of control. This happens when a virus causes cancer.    Different viruses get into the body in different ways. You can get a virus by:  •Swallowing food or water that has come in contact with the virus (is contaminated).      •Breathing in droplets that have been coughed or sneezed into the air by an infected person.      •Touching a surface that has been contaminated with the virus and then touching your eyes, nose, or mouth.      •Being bitten by an insect or animal that carries the virus.      •Having sexual contact with a person who is infected with the virus.      •Being exposed to blood or fluids that contain the virus, either through an open cut or during a transfusion.      If a virus enters your body, your body's defense system (immune system) will try to fight the virus. You may be at higher risk for a viral illness if your immune system is weak.      What are the signs or symptoms?    You may have these symptoms, depending on the type of virus and the location of the cells that it invades:•Cold and flu viruses:  •Fever.      •Headache.      •Sore throat.      •Muscle aches.      •Stuffy nose (nasal congestion).      •Cough.      •Digestive system (gastrointestinal) viruses:  •Fever.      •Pain in the abdomen.      •Nausea.      •Diarrhea.      •Liver viruses (hepatitis):  •Loss of appetite.      •Tiredness.      •Skin or the white parts of your eyes turning yellow (jaundice).      •Brain and spinal cord viruses:  •Fever.      •Headache.      •Stiff neck.      •Nausea and vomiting.      •Confusion or sleepiness.      •Skin viruses:  •Warts.      •Itching.      •Rash.      •Sexually transmitted viruses:  •Discharge.      •Swelling.      •Redness.      •Rash.          How is this diagnosed?    This condition may be diagnosed based on one or more of the following:  •Symptoms.      •Medical history.      •Physical exam.      •Blood test, sample of mucus from your lungs (sputum sample), stool sample, or a swab of body fluids or a skin sore (lesion).        How is this treated?    Viruses can be hard to treat because they live within cells. Antibiotic medicines do not treat viruses because these medicines do not get inside cells. Treatment for a viral illness may include:  •Resting and drinking plenty of fluids.      •Medicines to relieve symptoms. These can include over-the-counter medicine for pain and fever, medicines for cough or congestion, and medicines to relieve diarrhea.      •Antiviral medicines. These medicines are available only for certain types of viruses.      Some viral illnesses can be prevented with vaccinations. A common example is the flu shot.      Follow these instructions at home:    Medicines     •Take over-the-counter and prescription medicines only as told by your health care provider.      •If you were prescribed an antiviral medicine, take it as told by your health care provider. Do not stop taking the antiviral even if you start to feel better.    •Be aware of when antibiotics are needed and when they are not needed. Antibiotics do not treat viruses. You may get an antibiotic if your health care provider thinks that you may have, or are at risk for, a bacterial infection and you have a viral infection.  •Do not ask for an antibiotic prescription if you have been diagnosed with a viral illness. Antibiotics will not make your illness go away faster.      •Frequently taking antibiotics when they are not needed can lead to antibiotic resistance. When this develops, the medicine no longer works against the bacteria that it normally fights.          General instructions      •Drink enough fluids to keep your urine pale yellow.      •Rest as much as possible.      •Return to your normal activities as told by your health care provider. Ask your health care provider what activities are safe for you.      •Keep all follow-up visits as told by your health care provider. This is important.        How is this prevented?     To reduce your risk of viral illness:  •Wash your hands often with soap and water for at least 20 seconds. If soap and water are not available, use hand .      •Avoid touching your nose, eyes, and mouth, especially if you have not washed your hands recently.      •If anyone in your household has a viral infection, clean all household surfaces that may have been in contact with the virus. Use soap and hot water. You may also use bleach that you have added water to (diluted).      •Stay away from people who are sick with symptoms of a viral infection.      •Do not share items such as toothbrushes and water bottles with other people.      •Keep your vaccinations up to date. This includes getting a yearly flu shot.      •Eat a healthy diet and get plenty of rest.        Contact a health care provider if:    •You have symptoms of a viral illness that do not go away.      •Your symptoms come back after going away.      •Your symptoms get worse.        Get help right away if you have:    •Trouble breathing.      •A severe headache or a stiff neck.      •Severe vomiting or pain in your abdomen.      These symptoms may represent a serious problem that is an emergency. Do not wait to see if the symptoms will go away. Get medical help right away. Call your local emergency services (911 in the [place] Do not drive yourself to the hospital.       Summary    •Viruses are types of germs that can get into a person's body and cause illness. Viral illnesses can range from mild to severe. They can affect various parts of the body.      •Viruses can be hard to treat. There are medicines to relieve symptoms, and there are some antiviral medicines.      •If you were prescribed an antiviral medicine, take it as told by your health care provider. Do not stop taking the antiviral even if you start to feel better.      •Contact a health care provider if you have symptoms of a viral illness that do not go away.      This information is not intended to replace advice given to you by your health care provider. Make sure you discuss any questions you have with your health care provider.

## 2022-05-02 NOTE — ED PROVIDER NOTE - PATIENT PORTAL LINK FT
Yes - the patient is able to be screened You can access the FollowMyHealth Patient Portal offered by [facility] by registering at the following website: [email or web address]. By joining [employer]’s FollowMyHealth portal, you will also be able to view your health information using other applications (apps) compatible with our system.

## 2022-05-02 NOTE — ED PROVIDER NOTE - CARE PROVIDER_API CALL
Nic Walsh [facility]  [address]  [place] [address]  Phone: [phone]  Fax: [phone]  Follow Up Time:

## 2022-05-03 VITALS
RESPIRATION RATE: 16 BRPM | TEMPERATURE: 99 F | HEART RATE: 89 BPM | DIASTOLIC BLOOD PRESSURE: 61 MMHG | SYSTOLIC BLOOD PRESSURE: 126 MMHG | OXYGEN SATURATION: 96 %

## 2022-05-03 LAB — LACTATE SERPL-SCNC: 1 MMOL/L — SIGNIFICANT CHANGE UP (ref 0.7–2)

## 2022-05-03 PROCEDURE — 36415 COLL VENOUS BLD VENIPUNCTURE: CPT

## 2022-05-03 PROCEDURE — 87040 BLOOD CULTURE FOR BACTERIA: CPT

## 2022-05-03 PROCEDURE — 85730 THROMBOPLASTIN TIME PARTIAL: CPT

## 2022-05-03 PROCEDURE — 71046 X-RAY EXAM CHEST 2 VIEWS: CPT

## 2022-05-03 PROCEDURE — 85025 COMPLETE CBC W/AUTO DIFF WBC: CPT

## 2022-05-03 PROCEDURE — 81001 URINALYSIS AUTO W/SCOPE: CPT

## 2022-05-03 PROCEDURE — 36000 PLACE NEEDLE IN VEIN: CPT | Mod: XU

## 2022-05-03 PROCEDURE — 80053 COMPREHEN METABOLIC PANEL: CPT

## 2022-05-03 PROCEDURE — 87086 URINE CULTURE/COLONY COUNT: CPT

## 2022-05-03 PROCEDURE — 87637 SARSCOV2&INF A&B&RSV AMP PRB: CPT

## 2022-05-03 PROCEDURE — 96374 THER/PROPH/DIAG INJ IV PUSH: CPT | Mod: XU

## 2022-05-03 PROCEDURE — 74177 CT ABD & PELVIS W/CONTRAST: CPT | Mod: MA

## 2022-05-03 PROCEDURE — 96361 HYDRATE IV INFUSION ADD-ON: CPT

## 2022-05-03 PROCEDURE — 85610 PROTHROMBIN TIME: CPT

## 2022-05-03 PROCEDURE — 99285 EMERGENCY DEPT VISIT HI MDM: CPT | Mod: 25

## 2022-05-03 PROCEDURE — 83605 ASSAY OF LACTIC ACID: CPT

## 2022-05-03 NOTE — ED ADULT NURSE REASSESSMENT NOTE - NS ED NURSE REASSESS COMMENT FT1
Tests resulted, VSS, pt reevaluated by MD, D/C home with f/u instructions.
urine specimen sent to lab, pt received at change of shift,  resting in stretcher, bed in low position, call bell within reach, plan of care discussed  will monitor.

## 2022-05-04 LAB
CULTURE RESULTS: SIGNIFICANT CHANGE UP
SPECIMEN SOURCE: SIGNIFICANT CHANGE UP

## 2022-05-08 LAB
CULTURE RESULTS: SIGNIFICANT CHANGE UP
CULTURE RESULTS: SIGNIFICANT CHANGE UP
SPECIMEN SOURCE: SIGNIFICANT CHANGE UP
SPECIMEN SOURCE: SIGNIFICANT CHANGE UP

## 2022-09-21 NOTE — ED ADULT NURSE REASSESSMENT NOTE - TEMPERATURE IN CELSIUS (DEGREES C)
4 Eyes Skin Assessment    Fariha Zarate is being assessed upon: Admission    I agree that Flo Truong RN, along with Chan Pena RN (either 2 RN's or 1 LPN and 1 RN) have performed a thorough Head to Toe Skin Assessment on the patient. ALL assessment sites listed below have been assessed. Areas assessed by both nurses:     [x]   Head, Face, and Ears   [x]   Shoulders, Back, and Chest  [x]   Arms, Elbows, and Hands   [x]   Coccyx, Sacrum, and Ischium  [x]   Legs, Feet, and Heels    Does the Patient have Skin Breakdown?  No    Javed Prevention initiated: NA  Wound Care Orders initiated: NA    WOC nurse consulted for Pressure Injury (Stage 3,4, Unstageable, DTI, NWPT, and Complex wounds) and New or Established Ostomies: NA        Primary Nurse eSignature: Ayde London RN on 9/21/2022 at 6:13 PM      Co-Signer eSignature: Electronically signed by Ayde London RN on 9/21/22 at 6:13 PM CDT
Sheyla Burton arrived to room # [id number]. Presented with: CHEST PAIN  Mental Status: Patient is oriented, alert, coherent, logical, thought processes intact, and able to concentrate and follow conversation. Vitals:    09/21/22 1747   BP:    Pulse: 67   Resp:    Temp:    SpO2:      Patient safety contract and falls prevention contract reviewed with patient Yes. Oriented Patient and Family to room. Call light within reach. Yes.   Needs, issues or concerns expressed at this time: no.      Electronically signed by Ama Arthur RN on 9/21/2022 at 6:12 PM
37.2
37.3

## 2022-11-15 NOTE — SBIRT NOTE ADULT - NSSBIRTSCREENAVAIL_GEN_A_CORE
No

## 2022-12-28 ENCOUNTER — APPOINTMENT (OUTPATIENT)
Dept: DERMATOLOGY | Facility: CLINIC | Age: 52
End: 2022-12-28

## 2022-12-28 PROCEDURE — 99213 OFFICE O/P EST LOW 20 MIN: CPT

## 2023-02-21 ENCOUNTER — APPOINTMENT (OUTPATIENT)
Dept: DERMATOLOGY | Facility: CLINIC | Age: 53
End: 2023-02-21

## 2023-07-17 ENCOUNTER — RESULT CHARGE (OUTPATIENT)
Age: 53
End: 2023-07-17

## 2023-07-19 ENCOUNTER — APPOINTMENT (OUTPATIENT)
Dept: INTERNAL MEDICINE | Facility: CLINIC | Age: 53
End: 2023-07-19
Payer: COMMERCIAL

## 2023-07-19 ENCOUNTER — NON-APPOINTMENT (OUTPATIENT)
Age: 53
End: 2023-07-19

## 2023-07-19 VITALS
HEART RATE: 83 BPM | WEIGHT: 205 LBS | DIASTOLIC BLOOD PRESSURE: 88 MMHG | RESPIRATION RATE: 17 BRPM | BODY MASS INDEX: 31.07 KG/M2 | OXYGEN SATURATION: 95 % | HEIGHT: 68 IN | SYSTOLIC BLOOD PRESSURE: 138 MMHG | TEMPERATURE: 98.9 F

## 2023-07-19 DIAGNOSIS — Z00.00 ENCOUNTER FOR GENERAL ADULT MEDICAL EXAMINATION W/OUT ABNORMAL FINDINGS: ICD-10-CM

## 2023-07-19 DIAGNOSIS — Z80.6 FAMILY HISTORY OF LEUKEMIA: ICD-10-CM

## 2023-07-19 DIAGNOSIS — K21.9 GASTRO-ESOPHAGEAL REFLUX DISEASE W/OUT ESOPHAGITIS: ICD-10-CM

## 2023-07-19 DIAGNOSIS — Z87.891 PERSONAL HISTORY OF NICOTINE DEPENDENCE: ICD-10-CM

## 2023-07-19 DIAGNOSIS — I73.00 RAYNAUD'S SYNDROME W/OUT GANGRENE: ICD-10-CM

## 2023-07-19 DIAGNOSIS — Z82.49 FAMILY HISTORY OF ISCHEMIC HEART DISEASE AND OTHER DISEASES OF THE CIRCULATORY SYSTEM: ICD-10-CM

## 2023-07-19 DIAGNOSIS — R63.5 ABNORMAL WEIGHT GAIN: ICD-10-CM

## 2023-07-19 DIAGNOSIS — C51.9 MALIGNANT NEOPLASM OF VULVA, UNSPECIFIED: ICD-10-CM

## 2023-07-19 DIAGNOSIS — M54.16 RADICULOPATHY, LUMBAR REGION: ICD-10-CM

## 2023-07-19 DIAGNOSIS — Z78.9 OTHER SPECIFIED HEALTH STATUS: ICD-10-CM

## 2023-07-19 DIAGNOSIS — F43.10 POST-TRAUMATIC STRESS DISORDER, UNSPECIFIED: ICD-10-CM

## 2023-07-19 DIAGNOSIS — R03.0 ELEVATED BLOOD-PRESSURE READING, W/OUT DIAGNOSIS OF HYPERTENSION: ICD-10-CM

## 2023-07-19 PROCEDURE — G0444 DEPRESSION SCREEN ANNUAL: CPT | Mod: 59

## 2023-07-19 PROCEDURE — 99386 PREV VISIT NEW AGE 40-64: CPT | Mod: 25

## 2023-07-19 PROCEDURE — 36415 COLL VENOUS BLD VENIPUNCTURE: CPT

## 2023-07-19 PROCEDURE — 99203 OFFICE O/P NEW LOW 30 MIN: CPT | Mod: 25

## 2023-07-19 PROCEDURE — 93000 ELECTROCARDIOGRAM COMPLETE: CPT | Mod: 59

## 2023-07-19 RX ORDER — OMEPRAZOLE MAGNESIUM 40 MG/1
CAPSULE, DELAYED RELEASE ORAL
Refills: 0 | Status: DISCONTINUED | COMMUNITY
End: 2023-07-19

## 2023-07-19 RX ORDER — PHENTERMINE HYDROCHLORIDE 37.5 MG/1
TABLET ORAL
Refills: 0 | Status: DISCONTINUED | COMMUNITY
End: 2023-07-19

## 2023-07-19 RX ORDER — METHYLPHENIDATE HYDROCHLORIDE 30 MG/1
30 CAPSULE, EXTENDED RELEASE ORAL
Refills: 0 | Status: ACTIVE | COMMUNITY

## 2023-07-19 RX ORDER — RABEPRAZOLE SODIUM 20 MG/1
20 TABLET, DELAYED RELEASE ORAL
Refills: 0 | Status: ACTIVE | COMMUNITY

## 2023-07-19 RX ORDER — TOPIRAMATE 50 MG/1
TABLET, COATED ORAL
Refills: 0 | Status: DISCONTINUED | COMMUNITY
End: 2023-07-19

## 2023-07-19 RX ORDER — ATORVASTATIN CALCIUM 10 MG/1
10 TABLET, FILM COATED ORAL
Qty: 90 | Refills: 0 | Status: DISCONTINUED | COMMUNITY
Start: 1900-01-01 | End: 2023-07-19

## 2023-07-19 NOTE — HISTORY OF PRESENT ILLNESS
[FreeTextEntry1] : New patient annual wellness exam [de-identified] : New patient is a 53 year old female who presents for an annual wellness visit. \par \par Patient is a retired . She is single and raising her 15 y/o niece. \par \par Patient takes all medications as prescribed. Patient denies any adverse reactions to the medications. \par \par Patient has a past medical history of rheumatoid arthritis, GERD, pulmonary granulomas, ADD, Hx of acute epiploic appendagitis and Raynaud's phenomenon. She reports the GERD causes her to vomit in the morning and notes her last episode was x3 day ago. Her GERD is managed on rabeprazole. Patient has a surgical history of  left breast lumpectomy, vulvectomy (vulvar cancer), partial hysterectomy. Patient has quit smoking 8 years ago. She notes she is a social drinker. Patient has significant family history of leukemia (mother,  at 50) and CAD (father and uncle). She notes her father received a triple heart bypass surgery and Mosaic pig heart valve replacement. She received Influenza Vaccine in 2022. She received the COVID-19 Vaccine (Pfizer, x3). She is not UTD with Tdap Vaccine . She received 1 shot of the shingles vaccine (Shingrix) at [facility].  \par \par Patient received a colonoscopy done by Dr. Salima Loo in . She reports 2 polyps were found on the colonoscopy. Patient sees GYN, Dr. Recio. Her last annual GYN visit was on 2023. Patient is UTD with breast imaging (mamm[facility]. She reports her results were WNL. She reports having a stress test in 2018, with cardiologist,  Dr. Shepard. She reports she has not seen Dr. Shepard since her last stress test. \par \par She reports she has a history of PTSD since . She notes she believes the PTSD is secondary to events of  while on duty. She has been experiencing forgetfulness that becomes overwhelming. She reports she sees a psychiatrist which has helped her with her PTSD symptoms. \par \par Patient notes gaining 70 lbs since 2016. She notes significant life changes such as surgery, taking care of niece, and diet whichh have all contributed to weight gain. She exercises by jogging 3 miles a day and biking. She notes she does not eat during the day and has big meals in the night time. She reports a history of insulin resistance. \par \par She reports left lower back pain that radiates down her leg that began about 6 years ago. She takes Ibuprofen to relive the pain. She notes receiving an MRI of the lumbar spine on 10/29/19.

## 2023-07-19 NOTE — REVIEW OF SYSTEMS
[Recent Change In Weight] : ~T recent weight change [Heartburn] : heartburn [Back Pain] : back pain [Memory Loss] : memory loss [Negative] : Heme/Lymph [FreeTextEntry2] : gained weight [de-identified] : PTSD [FreeTextEntry9] : lumbar radiculopathy

## 2023-07-19 NOTE — HEALTH RISK ASSESSMENT
[2 - 4 times a month (2 pts)] : 2-4 times a month (2 points) [3 or 4 (1 pt)] : 3 or 4  (1 point) [Less than monthly (1 pt)] : Less than monthly (1 point) [No] : In the past 12 months have you used drugs other than those required for medical reasons? No [1] : 1) Little interest or pleasure doing things for several days (1) [2] : 2) Feeling down, depressed, or hopeless for more than half of the days (2) [PHQ-2 Positive] : PHQ-2 Positive [PHQ-9 Negative - No further assessment needed] : PHQ-9 Negative - No further assessment needed [Audit-CScore] : 4 [[id number]] : 3

## 2023-07-19 NOTE — PHYSICAL EXAM
[No Acute Distress] : no acute distress [Well Nourished] : well nourished [Well Developed] : well developed [Well-Appearing] : well-appearing [Normal Sclera/Conjunctiva] : normal sclera/conjunctiva [PERRL] : pupils equal round and reactive to light [EOMI] : extraocular movements intact [Normal Outer Ear/Nose] : the outer ears and nose were normal in appearance [Normal Oropharynx] : the oropharynx was normal [No JVD] : no jugular venous distention [No Lymphadenopathy] : no lymphadenopathy [Supple] : supple [Thyroid Normal, No Nodules] : the thyroid was normal and there were no nodules present [No Respiratory Distress] : no respiratory distress  [No Accessory Muscle Use] : no accessory muscle use [Clear to Auscultation] : lungs were clear to auscultation bilaterally [Normal Rate] : normal rate  [Regular Rhythm] : with a regular rhythm [Normal S1, S2] : normal S1 and S2 [No Murmur] : no murmur heard [No Carotid Bruits] : no carotid bruits [No Abdominal Bruit] : a ~M bruit was not heard ~T in the abdomen [Pedal Pulses Present] : the pedal pulses are present [No Edema] : there was no peripheral edema [No Palpable Aorta] : no palpable aorta [No Extremity Clubbing/Cyanosis] : no extremity clubbing/cyanosis [Soft] : abdomen soft [Non Tender] : non-tender [Non-distended] : non-distended [No Masses] : no abdominal mass palpated [No HSM] : no HSM [Normal Bowel Sounds] : normal bowel sounds [Normal Posterior Cervical Nodes] : no posterior cervical lymphadenopathy [Normal Anterior Cervical Nodes] : no anterior cervical lymphadenopathy [No CVA Tenderness] : no CVA  tenderness [No Spinal Tenderness] : no spinal tenderness [No Joint Swelling] : no joint swelling [Grossly Normal Strength/Tone] : grossly normal strength/tone [No Rash] : no rash [Coordination Grossly Intact] : coordination grossly intact [No Focal Deficits] : no focal deficits [Normal Gait] : normal gait [Deep Tendon Reflexes (DTR)] : deep tendon reflexes were 2+ and symmetric [Normal Affect] : the affect was normal [Normal Insight/Judgement] : insight and judgment were intact [Normal Voice/Communication] : normal voice/communication [Normal TMs] : both tympanic membranes were normal [Normal Nasal Mucosa] : the nasal mucosa was normal [Declined Breast Exam] : declined breast exam  [No Hernias] : no hernias [Declined Rectal Exam] : declined rectal exam [Normal Supraclavicular Nodes] : no supraclavicular lymphadenopathy [Kyphosis] : no kyphosis [Scoliosis] : no scoliosis [Acne] : no acne [Speech Grossly Normal] : speech grossly normal [Alert and Oriented x3] : oriented to person, place, and time [Normal Mood] : the mood was normal [de-identified] : Gained weight  [de-identified] : spider veins bilaterally [de-identified] : done by GYN (Dr. Recio) [de-identified] : obese [FreeTextEntry1] : done by GI

## 2023-07-19 NOTE — ADDENDUM
[FreeTextEntry1] : This note was written by Lin Moffett on 07/19/2023, acting as a scribe for Dr. Manolo Tillman DO.\par \par All medic record entries were at my, Dr. Manolo Tillman DO , direction and personally dictated by me in 07/19/2023. I have personally reviewed the chart and agree that the record accurately reflects my personal performance of the history, physical exam, assessment, and plan.

## 2023-07-19 NOTE — PLAN
[FreeTextEntry1] : Endocrinology \par hypercholesterolemia - continue Atorvastatin 10 MG p.o.q.d as directed-check FLP and LFTs\par Vascular \par spider veins - continue Prazosin HCl 5 MG p.o.q.d as directed \par GYN\par continue to follow-up with Dr. Recio as necessary \par Musculoskeletal \par lumbar back pain with bilateral radiculopathy-c/w ibuprofen w/food prn, to obtain MRI lumbar spine from 2019\par Gastroenterolgy\par follow-up with Dr. Loo to schedule colonoscopy in 5 years\par GERD - continue rabeprazole \par Psychiatry \par history of PTSD - continue to Lexapro 15 MG - follow-up with psychiatrist as needed - continue Xanax p.o.q.d as needed\par \par Blood work done in office - check female panel and Vitamin D\par follow up in 4-6 weeks to start working on multiple unresolved medical issues

## 2023-07-21 LAB
25(OH)D3 SERPL-MCNC: 58.9 NG/ML
ALBUMIN SERPL ELPH-MCNC: 5 G/DL
ALP BLD-CCNC: 135 U/L
ALT SERPL-CCNC: 42 U/L
ANION GAP SERPL CALC-SCNC: 15 MMOL/L
AST SERPL-CCNC: 34 U/L
BILIRUB SERPL-MCNC: 0.3 MG/DL
BUN SERPL-MCNC: 16 MG/DL
CALCIUM SERPL-MCNC: 10.2 MG/DL
CHLORIDE SERPL-SCNC: 101 MMOL/L
CHOLEST SERPL-MCNC: 251 MG/DL
CO2 SERPL-SCNC: 25 MMOL/L
CREAT SERPL-MCNC: 0.83 MG/DL
EGFR: 84 ML/MIN/1.73M2
ESTIMATED AVERAGE GLUCOSE: 123 MG/DL
GLUCOSE SERPL-MCNC: 82 MG/DL
HBA1C MFR BLD HPLC: 5.9 %
HDLC SERPL-MCNC: 80 MG/DL
LDLC SERPL CALC-MCNC: 155 MG/DL
NONHDLC SERPL-MCNC: 172 MG/DL
POTASSIUM SERPL-SCNC: 4.4 MMOL/L
PROT SERPL-MCNC: 7.8 G/DL
SODIUM SERPL-SCNC: 141 MMOL/L
TRIGL SERPL-MCNC: 100 MG/DL
TSH SERPL-ACNC: 1.33 UIU/ML

## 2023-07-24 ENCOUNTER — NON-APPOINTMENT (OUTPATIENT)
Age: 53
End: 2023-07-24

## 2023-09-23 NOTE — ED ADULT TRIAGE NOTE - PRO INTERPRETER NEED 2
Please call patient and let her know I cannot refill her methotrexate until she has labs done. These have been ordered. Also, does she have a new rheumatologist yet? Gareth Dong English

## 2023-10-01 PROBLEM — M54.16 LUMBAR RADICULAR PAIN: Status: ACTIVE | Noted: 2023-07-19

## 2023-11-09 DIAGNOSIS — Z79.899 OTHER LONG TERM (CURRENT) DRUG THERAPY: ICD-10-CM

## 2023-11-10 ENCOUNTER — LABORATORY RESULT (OUTPATIENT)
Age: 53
End: 2023-11-10

## 2023-11-10 ENCOUNTER — APPOINTMENT (OUTPATIENT)
Dept: INTERNAL MEDICINE | Facility: CLINIC | Age: 53
End: 2023-11-10
Payer: COMMERCIAL

## 2023-11-10 VITALS
BODY MASS INDEX: 28.95 KG/M2 | OXYGEN SATURATION: 98 % | WEIGHT: 191 LBS | DIASTOLIC BLOOD PRESSURE: 78 MMHG | SYSTOLIC BLOOD PRESSURE: 128 MMHG | HEART RATE: 81 BPM | RESPIRATION RATE: 16 BRPM | TEMPERATURE: 96.4 F | HEIGHT: 68 IN

## 2023-11-10 DIAGNOSIS — E78.5 HYPERLIPIDEMIA, UNSPECIFIED: ICD-10-CM

## 2023-11-10 DIAGNOSIS — R74.8 ABNORMAL LEVELS OF OTHER SERUM ENZYMES: ICD-10-CM

## 2023-11-10 DIAGNOSIS — R73.9 HYPERGLYCEMIA, UNSPECIFIED: ICD-10-CM

## 2023-11-10 DIAGNOSIS — R82.90 UNSPECIFIED ABNORMAL FINDINGS IN URINE: ICD-10-CM

## 2023-11-10 DIAGNOSIS — E66.9 OBESITY, UNSPECIFIED: ICD-10-CM

## 2023-11-10 PROCEDURE — 99214 OFFICE O/P EST MOD 30 MIN: CPT | Mod: 25

## 2023-11-10 PROCEDURE — 36415 COLL VENOUS BLD VENIPUNCTURE: CPT

## 2023-11-10 RX ORDER — PRAZOSIN HYDROCHLORIDE 5 MG/1
CAPSULE ORAL
Refills: 0 | Status: DISCONTINUED | COMMUNITY
End: 2023-11-10

## 2023-11-10 RX ORDER — ATORVASTATIN CALCIUM 80 MG/1
TABLET, FILM COATED ORAL
Refills: 0 | Status: DISCONTINUED | COMMUNITY
End: 2023-11-10

## 2023-11-12 PROBLEM — E78.5 HYPERLIPIDEMIA: Status: ACTIVE | Noted: 2023-07-19

## 2023-11-12 PROBLEM — R73.9 HYPERGLYCEMIA: Status: ACTIVE | Noted: 2023-11-12

## 2023-11-12 PROBLEM — R74.8 ELEVATED ALKALINE PHOSPHATASE LEVEL: Status: ACTIVE | Noted: 2023-11-09

## 2023-11-12 PROBLEM — E66.9 OBESITY (BMI 30-39.9): Status: ACTIVE | Noted: 2021-11-30

## 2023-11-13 LAB
APPEARANCE: CLEAR
BILIRUBIN URINE: NEGATIVE
BLOOD URINE: NEGATIVE
COLOR: YELLOW
GLUCOSE QUALITATIVE U: NEGATIVE MG/DL
KETONES URINE: 15 MG/DL
LEUKOCYTE ESTERASE URINE: ABNORMAL
NITRITE URINE: NEGATIVE
PH URINE: 6
PROTEIN URINE: NEGATIVE MG/DL
SPECIFIC GRAVITY URINE: 1.01
UROBILINOGEN URINE: 0.2 MG/DL

## 2023-12-28 ENCOUNTER — APPOINTMENT (OUTPATIENT)
Dept: DERMATOLOGY | Facility: CLINIC | Age: 53
End: 2023-12-28

## 2024-04-03 ENCOUNTER — APPOINTMENT (OUTPATIENT)
Dept: DERMATOLOGY | Facility: CLINIC | Age: 54
End: 2024-04-03
Payer: COMMERCIAL

## 2024-04-03 PROCEDURE — 99213 OFFICE O/P EST LOW 20 MIN: CPT | Mod: 25

## 2024-04-03 PROCEDURE — 17110 DESTRUCTION B9 LES UP TO 14: CPT

## 2024-06-10 ENCOUNTER — RX RENEWAL (OUTPATIENT)
Age: 54
End: 2024-06-10

## 2024-06-25 ENCOUNTER — APPOINTMENT (OUTPATIENT)
Dept: INTERNAL MEDICINE | Facility: CLINIC | Age: 54
End: 2024-06-25

## 2024-06-25 VITALS
WEIGHT: 197.5 LBS | BODY MASS INDEX: 29.93 KG/M2 | RESPIRATION RATE: 16 BRPM | HEIGHT: 68 IN | TEMPERATURE: 97.8 F | HEART RATE: 74 BPM | OXYGEN SATURATION: 98 % | SYSTOLIC BLOOD PRESSURE: 132 MMHG | DIASTOLIC BLOOD PRESSURE: 70 MMHG

## 2024-06-25 PROCEDURE — 36415 COLL VENOUS BLD VENIPUNCTURE: CPT

## 2024-06-25 PROCEDURE — 99214 OFFICE O/P EST MOD 30 MIN: CPT

## 2024-06-25 PROCEDURE — G2211 COMPLEX E/M VISIT ADD ON: CPT | Mod: NC

## 2024-06-26 LAB
ALBUMIN SERPL ELPH-MCNC: 4.8 G/DL
ALP BLD-CCNC: 134 U/L
ALT SERPL-CCNC: 33 U/L
ANION GAP SERPL CALC-SCNC: 15 MMOL/L
AST SERPL-CCNC: 29 U/L
BILIRUB SERPL-MCNC: 0.6 MG/DL
BUN SERPL-MCNC: 12 MG/DL
CALCIUM SERPL-MCNC: 9.5 MG/DL
CHLORIDE SERPL-SCNC: 102 MMOL/L
CHOLEST SERPL-MCNC: 238 MG/DL
CO2 SERPL-SCNC: 25 MMOL/L
CREAT SERPL-MCNC: 0.75 MG/DL
EGFR: 94 ML/MIN/1.73M2
ESTIMATED AVERAGE GLUCOSE: 120 MG/DL
GLUCOSE SERPL-MCNC: 84 MG/DL
HBA1C MFR BLD HPLC: 5.8 %
HDLC SERPL-MCNC: 88 MG/DL
LDLC SERPL CALC-MCNC: 135 MG/DL
NONHDLC SERPL-MCNC: 150 MG/DL
POTASSIUM SERPL-SCNC: 4.1 MMOL/L
PROT SERPL-MCNC: 7.5 G/DL
SODIUM SERPL-SCNC: 142 MMOL/L
TRIGL SERPL-MCNC: 87 MG/DL

## 2024-08-07 ENCOUNTER — APPOINTMENT (OUTPATIENT)
Dept: INTERNAL MEDICINE | Facility: CLINIC | Age: 54
End: 2024-08-07

## 2024-08-07 PROBLEM — M54.2 NECK PAIN: Status: ACTIVE | Noted: 2024-08-07

## 2024-08-07 PROCEDURE — G2211 COMPLEX E/M VISIT ADD ON: CPT | Mod: NC

## 2024-08-07 PROCEDURE — 99214 OFFICE O/P EST MOD 30 MIN: CPT

## 2024-08-07 NOTE — PHYSICAL EXAM
[No Acute Distress] : no acute distress [Well Nourished] : well nourished [Well Developed] : well developed [Well-Appearing] : well-appearing [Normal Voice/Communication] : normal voice/communication [Normal Sclera/Conjunctiva] : normal sclera/conjunctiva [PERRL] : pupils equal round and reactive to light [EOMI] : extraocular movements intact [Normal Outer Ear/Nose] : the outer ears and nose were normal in appearance [Normal Oropharynx] : the oropharynx was normal [Normal TMs] : both tympanic membranes were normal [Normal Nasal Mucosa] : the nasal mucosa was normal [No JVD] : no jugular venous distention [No Lymphadenopathy] : no lymphadenopathy [Supple] : supple [Thyroid Normal, No Nodules] : the thyroid was normal and there were no nodules present [No Respiratory Distress] : no respiratory distress  [No Accessory Muscle Use] : no accessory muscle use [Clear to Auscultation] : lungs were clear to auscultation bilaterally [Normal Rate] : normal rate  [Regular Rhythm] : with a regular rhythm [Normal S1, S2] : normal S1 and S2 [No Murmur] : no murmur heard [No Carotid Bruits] : no carotid bruits [No Abdominal Bruit] : a ~M bruit was not heard ~T in the abdomen [No Varicosities] : no varicosities [Pedal Pulses Present] : the pedal pulses are present [No Edema] : there was no peripheral edema [No Palpable Aorta] : no palpable aorta [Soft] : abdomen soft [Non Tender] : non-tender [Non-distended] : non-distended [No Masses] : no abdominal mass palpated [No HSM] : no HSM [Normal Bowel Sounds] : normal bowel sounds [Normal Supraclavicular Nodes] : no supraclavicular lymphadenopathy [Normal Posterior Cervical Nodes] : no posterior cervical lymphadenopathy [Normal Anterior Cervical Nodes] : no anterior cervical lymphadenopathy [No CVA Tenderness] : no CVA  tenderness [No Spinal Tenderness] : no spinal tenderness [Kyphosis] : no kyphosis [Scoliosis] : no scoliosis [No Joint Swelling] : no joint swelling [Grossly Normal Strength/Tone] : grossly normal strength/tone [No Rash] : no rash [Acne] : no acne [Coordination Grossly Intact] : coordination grossly intact [No Focal Deficits] : no focal deficits [Normal Gait] : normal gait [Deep Tendon Reflexes (DTR)] : deep tendon reflexes were 2+ and symmetric [Speech Grossly Normal] : speech grossly normal [Memory Grossly Normal] : memory grossly normal [Normal Affect] : the affect was normal [Alert and Oriented x3] : oriented to person, place, and time [Normal Mood] : the mood was normal [Normal Insight/Judgement] : insight and judgment were intact [Normal] : affect was normal and insight and judgment were intact [de-identified] : no carotid bruits bilaterally, mild right anterior neck tenderness at area between right carotid artery and thyroid gland [de-identified] : overweight

## 2024-08-07 NOTE — PLAN
[FreeTextEntry1] : Endocrinology hypercholesterolemia - continue Atorvastatin 40 MG p.o.q.d as directed, continue low carb/low fat diet and CV exercise - check FLP/LFTs 12/24 overweight-c/w Nutrasystem, counselled on diet and transition from Nutrasystem to regular diet hyperglycemia-reviewed low carbohydrate/sugar diet and increase physical activity-recheck A1C 12/24 Musculoskeletal right anterior neck pain-check US thyroid and US neck and soft tissues GI h/o elevated alkaline phosphatase - check Alkaline Phosphatase isoenzymes at next blood work GERD - continue Rabeprazole Sodium 20mg p.o. qd and antireflux measures Psychiatry history of PTSD - continue to Lexapro 15 MG p.o.q.d. as directed, continue Xanax p.o.q.d as needed - follow-up with psychiatrist as needed  history of nightmares - continue Prazosin HCl 5mg p.o. as needed  history of ADHD - continue Methylphenidate HCl 30mg p.o. as needed

## 2024-08-07 NOTE — HISTORY OF PRESENT ILLNESS
[FreeTextEntry1] : follow up recent blood results right neck pain [de-identified] : 54 year old female PMH as below presents for follow up of recent blood results and for evaluation of right neck pain.  Blood results from June 25, 2024 were normal except for alk phos of 134,  on atorvastatin 20 mg qd and hemoglobin A1C of 5.8%.  She was instructed to increase her atorvastatin to 40 mg qd which she has tolerated to date without diffuse joint pain or muscle pain.  Her hemoglobin A1C improved slightly from her previous value of 5.9%..She complains of right sided anterior neck pain of 1 months duration.  She describes the pain as dull, achy and intermittent.  It is located in the area between the right carotid artery and the right border of the thyroid gland.  She notes no dysphagia.  The pain is exacerbated by side bending of the neck to the left and is alleviated by side bending the neck to the right.

## 2024-08-07 NOTE — ASSESSMENT
[FreeTextEntry1] : 54 year old female presents for follow up of recent blood abnormalities and 1 month hx of right anterior neck pain.

## 2024-08-08 ENCOUNTER — OUTPATIENT (OUTPATIENT)
Dept: OUTPATIENT SERVICES | Facility: HOSPITAL | Age: 54
LOS: 1 days | End: 2024-08-08
Payer: COMMERCIAL

## 2024-08-08 ENCOUNTER — APPOINTMENT (OUTPATIENT)
Dept: ULTRASOUND IMAGING | Facility: CLINIC | Age: 54
End: 2024-08-08

## 2024-08-08 DIAGNOSIS — C51.9 MALIGNANT NEOPLASM OF VULVA, UNSPECIFIED: Chronic | ICD-10-CM

## 2024-08-08 DIAGNOSIS — Z90.710 ACQUIRED ABSENCE OF BOTH CERVIX AND UTERUS: Chronic | ICD-10-CM

## 2024-08-08 DIAGNOSIS — Z98.890 OTHER SPECIFIED POSTPROCEDURAL STATES: Chronic | ICD-10-CM

## 2024-08-08 DIAGNOSIS — Z86.018 PERSONAL HISTORY OF OTHER BENIGN NEOPLASM: Chronic | ICD-10-CM

## 2024-08-08 PROCEDURE — 76536 US EXAM OF HEAD AND NECK: CPT | Mod: 26

## 2024-08-08 PROCEDURE — 76536 US EXAM OF HEAD AND NECK: CPT

## 2024-11-26 ENCOUNTER — EMERGENCY (EMERGENCY)
Facility: HOSPITAL | Age: 54
LOS: 1 days | Discharge: HOME | End: 2024-11-26
Attending: EMERGENCY MEDICINE
Payer: COMMERCIAL

## 2024-11-26 VITALS
HEIGHT: 68 IN | HEART RATE: 76 BPM | RESPIRATION RATE: 20 BRPM | SYSTOLIC BLOOD PRESSURE: 163 MMHG | DIASTOLIC BLOOD PRESSURE: 65 MMHG | TEMPERATURE: 98 F | WEIGHT: 209.44 LBS | OXYGEN SATURATION: 100 %

## 2024-11-26 DIAGNOSIS — Z98.890 OTHER SPECIFIED POSTPROCEDURAL STATES: Chronic | ICD-10-CM

## 2024-11-26 DIAGNOSIS — Z86.018 PERSONAL HISTORY OF OTHER BENIGN NEOPLASM: Chronic | ICD-10-CM

## 2024-11-26 DIAGNOSIS — C51.9 MALIGNANT NEOPLASM OF VULVA, UNSPECIFIED: Chronic | ICD-10-CM

## 2024-11-26 DIAGNOSIS — Z90.710 ACQUIRED ABSENCE OF BOTH CERVIX AND UTERUS: Chronic | ICD-10-CM

## 2024-11-26 LAB
ALBUMIN SERPL ELPH-MCNC: 4.3 G/DL — SIGNIFICANT CHANGE UP (ref 3.3–5.2)
ALP SERPL-CCNC: 127 U/L — HIGH (ref 40–120)
ALT FLD-CCNC: 38 U/L — HIGH
ANION GAP SERPL CALC-SCNC: 13 MMOL/L — SIGNIFICANT CHANGE UP (ref 5–17)
AST SERPL-CCNC: 32 U/L — HIGH
BASOPHILS # BLD AUTO: 0.05 K/UL — SIGNIFICANT CHANGE UP (ref 0–0.2)
BASOPHILS NFR BLD AUTO: 0.6 % — SIGNIFICANT CHANGE UP (ref 0–2)
BILIRUB SERPL-MCNC: 0.4 MG/DL — SIGNIFICANT CHANGE UP (ref 0.4–2)
BUN SERPL-MCNC: 12.1 MG/DL — SIGNIFICANT CHANGE UP (ref 8–20)
CALCIUM SERPL-MCNC: 9.6 MG/DL — SIGNIFICANT CHANGE UP (ref 8.4–10.5)
CHLORIDE SERPL-SCNC: 103 MMOL/L — SIGNIFICANT CHANGE UP (ref 96–108)
CO2 SERPL-SCNC: 26 MMOL/L — SIGNIFICANT CHANGE UP (ref 22–29)
CREAT SERPL-MCNC: 0.77 MG/DL — SIGNIFICANT CHANGE UP (ref 0.5–1.3)
EGFR: 91 ML/MIN/1.73M2 — SIGNIFICANT CHANGE UP
EOSINOPHIL # BLD AUTO: 0.13 K/UL — SIGNIFICANT CHANGE UP (ref 0–0.5)
EOSINOPHIL NFR BLD AUTO: 1.6 % — SIGNIFICANT CHANGE UP (ref 0–6)
GLUCOSE SERPL-MCNC: 80 MG/DL — SIGNIFICANT CHANGE UP (ref 70–99)
HCT VFR BLD CALC: 37.5 % — SIGNIFICANT CHANGE UP (ref 34.5–45)
HGB BLD-MCNC: 12.6 G/DL — SIGNIFICANT CHANGE UP (ref 11.5–15.5)
IMM GRANULOCYTES NFR BLD AUTO: 0.2 % — SIGNIFICANT CHANGE UP (ref 0–0.9)
LYMPHOCYTES # BLD AUTO: 3.07 K/UL — SIGNIFICANT CHANGE UP (ref 1–3.3)
LYMPHOCYTES # BLD AUTO: 37.1 % — SIGNIFICANT CHANGE UP (ref 13–44)
MCHC RBC-ENTMCNC: 29.1 PG — SIGNIFICANT CHANGE UP (ref 27–34)
MCHC RBC-ENTMCNC: 33.6 G/DL — SIGNIFICANT CHANGE UP (ref 32–36)
MCV RBC AUTO: 86.6 FL — SIGNIFICANT CHANGE UP (ref 80–100)
MONOCYTES # BLD AUTO: 0.75 K/UL — SIGNIFICANT CHANGE UP (ref 0–0.9)
MONOCYTES NFR BLD AUTO: 9.1 % — SIGNIFICANT CHANGE UP (ref 2–14)
NEUTROPHILS # BLD AUTO: 4.25 K/UL — SIGNIFICANT CHANGE UP (ref 1.8–7.4)
NEUTROPHILS NFR BLD AUTO: 51.4 % — SIGNIFICANT CHANGE UP (ref 43–77)
PLATELET # BLD AUTO: 294 K/UL — SIGNIFICANT CHANGE UP (ref 150–400)
POTASSIUM SERPL-MCNC: 4.6 MMOL/L — SIGNIFICANT CHANGE UP (ref 3.5–5.3)
POTASSIUM SERPL-SCNC: 4.6 MMOL/L — SIGNIFICANT CHANGE UP (ref 3.5–5.3)
PROT SERPL-MCNC: 7.3 G/DL — SIGNIFICANT CHANGE UP (ref 6.6–8.7)
RBC # BLD: 4.33 M/UL — SIGNIFICANT CHANGE UP (ref 3.8–5.2)
RBC # FLD: 12 % — SIGNIFICANT CHANGE UP (ref 10.3–14.5)
SODIUM SERPL-SCNC: 142 MMOL/L — SIGNIFICANT CHANGE UP (ref 135–145)
TROPONIN T, HIGH SENSITIVITY RESULT: <6 NG/L — SIGNIFICANT CHANGE UP (ref 0–51)
WBC # BLD: 8.27 K/UL — SIGNIFICANT CHANGE UP (ref 3.8–10.5)
WBC # FLD AUTO: 8.27 K/UL — SIGNIFICANT CHANGE UP (ref 3.8–10.5)

## 2024-11-26 PROCEDURE — 93010 ELECTROCARDIOGRAM REPORT: CPT

## 2024-11-26 PROCEDURE — 93005 ELECTROCARDIOGRAM TRACING: CPT

## 2024-11-26 PROCEDURE — 99285 EMERGENCY DEPT VISIT HI MDM: CPT | Mod: 25

## 2024-11-26 PROCEDURE — 96376 TX/PRO/DX INJ SAME DRUG ADON: CPT

## 2024-11-26 PROCEDURE — 70450 CT HEAD/BRAIN W/O DYE: CPT | Mod: 26,MC

## 2024-11-26 PROCEDURE — 85025 COMPLETE CBC W/AUTO DIFF WBC: CPT

## 2024-11-26 PROCEDURE — 96375 TX/PRO/DX INJ NEW DRUG ADDON: CPT

## 2024-11-26 PROCEDURE — 96374 THER/PROPH/DIAG INJ IV PUSH: CPT

## 2024-11-26 PROCEDURE — 82962 GLUCOSE BLOOD TEST: CPT

## 2024-11-26 PROCEDURE — 70450 CT HEAD/BRAIN W/O DYE: CPT | Mod: MC

## 2024-11-26 PROCEDURE — 99285 EMERGENCY DEPT VISIT HI MDM: CPT

## 2024-11-26 PROCEDURE — 84484 ASSAY OF TROPONIN QUANT: CPT

## 2024-11-26 PROCEDURE — 71045 X-RAY EXAM CHEST 1 VIEW: CPT

## 2024-11-26 PROCEDURE — 80053 COMPREHEN METABOLIC PANEL: CPT

## 2024-11-26 PROCEDURE — 36415 COLL VENOUS BLD VENIPUNCTURE: CPT

## 2024-11-26 PROCEDURE — 71045 X-RAY EXAM CHEST 1 VIEW: CPT | Mod: 26

## 2024-11-26 RX ORDER — METOCLOPRAMIDE HYDROCHLORIDE 10 MG/1
10 TABLET ORAL ONCE
Refills: 0 | Status: COMPLETED | OUTPATIENT
Start: 2024-11-26 | End: 2024-11-26

## 2024-11-26 RX ORDER — ACETAMINOPHEN 500MG 500 MG/1
1000 TABLET, COATED ORAL ONCE
Refills: 0 | Status: COMPLETED | OUTPATIENT
Start: 2024-11-26 | End: 2024-11-26

## 2024-11-26 RX ADMIN — ACETAMINOPHEN 500MG 400 MILLIGRAM(S): 500 TABLET, COATED ORAL at 16:30

## 2024-11-26 RX ADMIN — ACETAMINOPHEN 500MG 400 MILLIGRAM(S): 500 TABLET, COATED ORAL at 22:31

## 2024-11-26 RX ADMIN — METOCLOPRAMIDE HYDROCHLORIDE 10 MILLIGRAM(S): 10 TABLET ORAL at 17:37

## 2024-11-26 NOTE — ED ADULT TRIAGE NOTE - CHIEF COMPLAINT QUOTE
pt arrives to ED c/o nausea/headache/dizziness, no pain meds taken, denies V/D, ambulatory in triage,

## 2024-11-26 NOTE — ED PROVIDER NOTE - NSICDXPASTMEDICALHX_GEN_ALL_CORE_FT
PAST MEDICAL HISTORY:  GERD (gastroesophageal reflux disease)     History of heart valve disorder     Hyperlipidemia     PTSD (post-traumatic stress disorder)

## 2024-11-26 NOTE — ED ADULT TRIAGE NOTE - GLASGOW COMA SCALE: SCORE, MLM
DARIAN CUEVAS    Patient Age: 76 year old   Refill request by: Pharmacy fax  Refill to be: ePrescribed to [facility] pharmacy    Medication requested to be refilled:   ritalin       WEIGHT AND HEIGHT:   Wt Readings from Last 1 Encounters:   06/18/20 61.1 kg (134 lb 11.2 oz)     Ht Readings from Last 1 Encounters:   06/18/20 5' 2\" (1.575 m)     BMI Readings from Last 1 Encounters:   06/18/20 24.64 kg/m²       ALLERGIES:  Sulfa antibiotics and Cephalexin  Current Outpatient Medications   Medication   • venlafaxine XR (EFFEXOR XR) 75 MG 24 hr capsule   • methylpheniDATE (RITALIN) 10 MG tablet   • lovastatin (MEVACOR) 20 MG tablet   • lovastatin (MEVACOR) 10 MG tablet   • docusate sodium (COLACE) 100 MG capsule   • acetaminophen (TYLENOL) 325 MG tablet   • Calcium 600-200 MG-UNIT Tab   • levothyroxine (SYNTHROID, LEVOTHROID) 50 MCG tablet     No current facility-administered medications for this visit.      PHARMACY to use:           Pharmacy preference(s) on file:   [id number] - [place], [place] - [address]  [address]  [facility] [address]  Phone: [phone] Fax: [phone]      CALL BACK INFO: Ok to leave response (including medical information) with family member or on answering machine  ROUTING: Patient's physician/staff        PCP: Zahida Morocho MD         INS: Payor: AARP MEDICARE ADVANTAGE / Plan: AARP MEDICARE ADVANTAGE [id number] / Product Type: MEDADV   PATIENT ADDRESS:  [address] [place] [id number]  
15

## 2024-11-26 NOTE — ED ADULT NURSE REASSESSMENT NOTE - NS ED NURSE REASSESS COMMENT FT1
report received from AMADA Andres, pt axo3 with equal and unlabored resp awaiting dt for CT, pt on tele monitor showing no signs of distress at this time

## 2024-11-26 NOTE — ED PROVIDER NOTE - OBJECTIVE STATEMENT
54 year old female with pmhx of hld, gerd, ptsd presents with chest pain. Pt states she has had intermittent episodes 54 year old female with pmhx of hld, gerd, ptsd, heart valve disorder presents with chest pain. Pt states she has had intermittent episodes of chest pain with shortness of breath for the past month, more frequent this week. Pt was at her PCP for routine checkup today and was told her blood pressure was elevated and to come to the ED. Also complaining of headache for the past week. Pt has not seen a cardiologist in 5 years. No hx of hypertension. Denies fever, focal neuro deficit, vision changes, lightheadedness, syncope, falls, nausea, or any other complaints at this time. 54 year old female with pmhx of hld, gerd, ptsd, heart valve disorder presents with dizziness. Pt states she has been feeling "off balance" with walking and standing for the past week. Pt states she has had intermittent episodes of chest pain with shortness of breath for the past month, more frequent this week. Pt was at her PCP for routine checkup today and was told her blood pressure was elevated and to come to the ED. Also complaining of headache for the past week. Pt has not seen a cardiologist in 5 years. No hx of hypertension. Denies fever, focal neuro deficit, vision changes, lightheadedness, syncope, falls, nausea, or any other complaints at this time.

## 2024-11-26 NOTE — ED PROVIDER NOTE - ATTENDING CONTRIBUTION TO CARE
I personally saw the patient with the resident, and completed the key components of the history and physical exam. I then discussed the management plan with the resident.    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  She states she has not seen a cardiologist in about 8 years.     NAD, anxious, no focal neurodeficits, bilateral TMs clear with moderate cerumen, no impaction, RRR, lungs CTA B/L, ABD soft, nontender, nondistended, no lower extremity edema.    EKG with no normal limits, will check labs, chest x-ray, CT head for duration of headache, treat headache and reassess

## 2024-11-26 NOTE — ED PROVIDER NOTE - PROGRESS NOTE DETAILS
Hector KAN: Pt received in sign out pending CTH. CTH negative. Pt stable, reports improved symptoms. Ambulatory in ED, normal gait. Will discharge home with PCP and outpatient neurology follow up. Pt requesting atorvastatin to be sent to pharmacy as she ran out of medication, will send to pharmacy. Domo KAN: Received patient in signout, agree with resident assessment above.

## 2024-11-26 NOTE — ED PROVIDER NOTE - PATIENT PORTAL LINK FT
You can access the FollowMyHealth Patient Portal offered by [facility] by registering at the following website: [email or web address]. By joining [facility]’s FollowMyHealth portal, you will also be able to view your health information using other applications (apps) compatible with our system.

## 2024-11-26 NOTE — ED PROVIDER NOTE - CARE PROVIDER_API CALL
Jose David Perea  Neurology  [address] [place] [address], [facility] [address]  [place] [address]  Phone: [phone]  Fax: [phone]  Follow Up Time: Routine

## 2024-11-26 NOTE — ED PROVIDER NOTE - CLINICAL SUMMARY MEDICAL DECISION MAKING FREE TEXT BOX
54 year old female with pmhx of hld, gerd, ptsd, heart valve disorder presents with chest pain. Pt states she has had intermittent episodes of chest pain with shortness of breath for the past month, more frequent this week. Pt was at her PCP for routine checkup today and was told her blood pressure was elevated and to come to the ED. Also complaining of headache for the past week. Pt has not seen a cardiologist in 5 years.
no

## 2024-11-26 NOTE — ED PROVIDER NOTE - NSFOLLOWUPINSTRUCTIONS_ED_ALL_ED_FT
1. Return to ED for worsening, progressive or any other concerning symptoms   2. Follow up with your primary care doctor in 2-3days  3. Follow up with a neurologist in the next few days. Call the number provided to set up an appointment  4. May take tylenol and ibuprofen for pain. Take tylenol 650mg every 6 hours, do not exceed 4000 mg in 24 hours. Take ibuprofen 400mg every 6 hours, do not exceed 3200 mg in 24 hours. May alternate between tylenol and ibuprofen every 3 hours.    Headache    A headache is pain or discomfort felt around the head or neck area. The specific cause of a headache may not be found as there are many types including tension headaches, migraine headaches, and cluster headaches. Watch your condition for any changes. Things you can do to manage your pain include taking over the counter and prescription medications as instructed by your health care provider, lying down in a dark quiet room, limiting stress, getting regular sleep, and refraining from alcohol and tobacco products.    SEEK IMMEDIATE MEDICAL CARE IF YOU HAVE ANY OF THE FOLLOWING SYMPTOMS: fever, vomiting, stiff neck, loss of vision, problems with speech, muscle weakness, loss of balance, trouble walking, passing out, or confusion.

## 2024-11-27 VITALS
OXYGEN SATURATION: 98 % | DIASTOLIC BLOOD PRESSURE: 69 MMHG | RESPIRATION RATE: 16 BRPM | SYSTOLIC BLOOD PRESSURE: 129 MMHG | TEMPERATURE: 98 F | HEART RATE: 60 BPM

## 2024-11-29 DIAGNOSIS — F43.10 POST-TRAUMATIC STRESS DISORDER, UNSPECIFIED: ICD-10-CM

## 2024-11-29 DIAGNOSIS — R42 DIZZINESS AND GIDDINESS: ICD-10-CM

## 2024-11-29 DIAGNOSIS — K21.9 GASTRO-ESOPHAGEAL REFLUX DISEASE WITHOUT ESOPHAGITIS: ICD-10-CM

## 2024-11-29 DIAGNOSIS — G44.209 TENSION-TYPE HEADACHE, UNSPECIFIED, NOT INTRACTABLE: ICD-10-CM

## 2024-11-29 DIAGNOSIS — Z88.0 ALLERGY STATUS TO PENICILLIN: ICD-10-CM

## 2024-11-29 DIAGNOSIS — J44.9 CHRONIC OBSTRUCTIVE PULMONARY DISEASE, UNSPECIFIED: ICD-10-CM

## 2024-12-23 ENCOUNTER — APPOINTMENT (OUTPATIENT)
Dept: INTERNAL MEDICINE | Facility: CLINIC | Age: 54
End: 2024-12-23
Payer: COMMERCIAL

## 2024-12-23 VITALS
DIASTOLIC BLOOD PRESSURE: 80 MMHG | SYSTOLIC BLOOD PRESSURE: 124 MMHG | BODY MASS INDEX: 31.83 KG/M2 | HEIGHT: 68 IN | OXYGEN SATURATION: 97 % | HEART RATE: 87 BPM | WEIGHT: 210 LBS | TEMPERATURE: 97.1 F

## 2024-12-23 VITALS
WEIGHT: 190 LBS | SYSTOLIC BLOOD PRESSURE: 128 MMHG | OXYGEN SATURATION: 98 % | TEMPERATURE: 98.7 F | HEIGHT: 68 IN | HEART RATE: 87 BPM | DIASTOLIC BLOOD PRESSURE: 80 MMHG | BODY MASS INDEX: 28.79 KG/M2

## 2024-12-23 DIAGNOSIS — E78.5 HYPERLIPIDEMIA, UNSPECIFIED: ICD-10-CM

## 2024-12-23 DIAGNOSIS — E66.9 OBESITY, UNSPECIFIED: ICD-10-CM

## 2024-12-23 DIAGNOSIS — R73.9 HYPERGLYCEMIA, UNSPECIFIED: ICD-10-CM

## 2024-12-23 DIAGNOSIS — Z79.899 OTHER LONG TERM (CURRENT) DRUG THERAPY: ICD-10-CM

## 2024-12-23 DIAGNOSIS — R74.8 ABNORMAL LEVELS OF OTHER SERUM ENZYMES: ICD-10-CM

## 2024-12-23 DIAGNOSIS — R09.81 NASAL CONGESTION: ICD-10-CM

## 2024-12-23 PROCEDURE — G2211 COMPLEX E/M VISIT ADD ON: CPT | Mod: NC

## 2024-12-23 PROCEDURE — 36415 COLL VENOUS BLD VENIPUNCTURE: CPT

## 2024-12-23 PROCEDURE — 99214 OFFICE O/P EST MOD 30 MIN: CPT

## 2024-12-23 RX ORDER — FLUTICASONE PROPIONATE 50 UG/1
50 SPRAY, METERED NASAL
Qty: 1 | Refills: 0 | Status: ACTIVE | COMMUNITY
Start: 2024-12-23 | End: 1900-01-01

## 2025-02-05 NOTE — ED ADULT NURSE NOTE - AS SC BRADEN MOISTURE
I have personally seen and examined the patient. I have collaborated with and supervised the
(4) rarely moist

## 2025-02-25 ENCOUNTER — APPOINTMENT (OUTPATIENT)
Dept: DERMATOLOGY | Facility: CLINIC | Age: 55
End: 2025-02-25
Payer: COMMERCIAL

## 2025-02-25 PROCEDURE — 99213 OFFICE O/P EST LOW 20 MIN: CPT

## 2025-04-16 ENCOUNTER — NON-APPOINTMENT (OUTPATIENT)
Age: 55
End: 2025-04-16

## 2025-04-21 ENCOUNTER — APPOINTMENT (OUTPATIENT)
Dept: INTERNAL MEDICINE | Facility: CLINIC | Age: 55
End: 2025-04-21
Payer: COMMERCIAL

## 2025-04-21 VITALS
RESPIRATION RATE: 16 BRPM | HEIGHT: 68 IN | TEMPERATURE: 98 F | BODY MASS INDEX: 31.09 KG/M2 | WEIGHT: 205.13 LBS | HEART RATE: 67 BPM | SYSTOLIC BLOOD PRESSURE: 126 MMHG | DIASTOLIC BLOOD PRESSURE: 84 MMHG | OXYGEN SATURATION: 99 %

## 2025-04-21 DIAGNOSIS — Z79.899 OTHER LONG TERM (CURRENT) DRUG THERAPY: ICD-10-CM

## 2025-04-21 DIAGNOSIS — R73.9 HYPERGLYCEMIA, UNSPECIFIED: ICD-10-CM

## 2025-04-21 DIAGNOSIS — E78.5 HYPERLIPIDEMIA, UNSPECIFIED: ICD-10-CM

## 2025-04-21 DIAGNOSIS — R74.8 ABNORMAL LEVELS OF OTHER SERUM ENZYMES: ICD-10-CM

## 2025-04-21 PROBLEM — Z86.79 PERSONAL HISTORY OF OTHER DISEASES OF THE CIRCULATORY SYSTEM: Chronic | Status: ACTIVE | Noted: 2024-11-26

## 2025-04-21 PROCEDURE — 99214 OFFICE O/P EST MOD 30 MIN: CPT

## 2025-04-21 PROCEDURE — G2211 COMPLEX E/M VISIT ADD ON: CPT | Mod: NC

## 2025-04-22 ENCOUNTER — APPOINTMENT (OUTPATIENT)
Dept: ULTRASOUND IMAGING | Facility: CLINIC | Age: 55
End: 2025-04-22
Payer: COMMERCIAL

## 2025-04-22 ENCOUNTER — OUTPATIENT (OUTPATIENT)
Dept: OUTPATIENT SERVICES | Facility: HOSPITAL | Age: 55
LOS: 1 days | End: 2025-04-22
Payer: COMMERCIAL

## 2025-04-22 DIAGNOSIS — C51.9 MALIGNANT NEOPLASM OF VULVA, UNSPECIFIED: Chronic | ICD-10-CM

## 2025-04-22 DIAGNOSIS — Z90.710 ACQUIRED ABSENCE OF BOTH CERVIX AND UTERUS: Chronic | ICD-10-CM

## 2025-04-22 DIAGNOSIS — Z98.890 OTHER SPECIFIED POSTPROCEDURAL STATES: Chronic | ICD-10-CM

## 2025-04-22 DIAGNOSIS — Z86.018 PERSONAL HISTORY OF OTHER BENIGN NEOPLASM: Chronic | ICD-10-CM

## 2025-04-22 DIAGNOSIS — Z00.8 ENCOUNTER FOR OTHER GENERAL EXAMINATION: ICD-10-CM

## 2025-04-22 PROCEDURE — 76700 US EXAM ABDOM COMPLETE: CPT

## 2025-04-22 PROCEDURE — 76700 US EXAM ABDOM COMPLETE: CPT | Mod: 26

## 2025-06-19 ENCOUNTER — RX RENEWAL (OUTPATIENT)
Age: 55
End: 2025-06-19

## 2025-07-21 ENCOUNTER — APPOINTMENT (OUTPATIENT)
Dept: DERMATOLOGY | Facility: CLINIC | Age: 55
End: 2025-07-21
Payer: COMMERCIAL

## 2025-07-21 PROCEDURE — 99214 OFFICE O/P EST MOD 30 MIN: CPT

## 2025-07-31 ENCOUNTER — NON-APPOINTMENT (OUTPATIENT)
Age: 55
End: 2025-07-31

## 2025-09-09 ENCOUNTER — APPOINTMENT (OUTPATIENT)
Age: 55
End: 2025-09-09
Payer: COMMERCIAL

## 2025-09-09 VITALS
TEMPERATURE: 97.8 F | OXYGEN SATURATION: 98 % | SYSTOLIC BLOOD PRESSURE: 124 MMHG | HEART RATE: 70 BPM | HEIGHT: 68 IN | WEIGHT: 197 LBS | BODY MASS INDEX: 29.86 KG/M2 | DIASTOLIC BLOOD PRESSURE: 80 MMHG

## 2025-09-09 DIAGNOSIS — T50.905A ADVERSE EFFECT OF UNSPECIFIED DRUGS, MEDICAMENTS AND BIOLOGICAL SUBSTANCES, INITIAL ENCOUNTER: ICD-10-CM

## 2025-09-09 DIAGNOSIS — E66.9 OBESITY, UNSPECIFIED: ICD-10-CM

## 2025-09-09 DIAGNOSIS — R74.8 ABNORMAL LEVELS OF OTHER SERUM ENZYMES: ICD-10-CM

## 2025-09-09 DIAGNOSIS — G47.33 OBSTRUCTIVE SLEEP APNEA (ADULT) (PEDIATRIC): ICD-10-CM

## 2025-09-09 DIAGNOSIS — E78.5 HYPERLIPIDEMIA, UNSPECIFIED: ICD-10-CM

## 2025-09-09 DIAGNOSIS — R73.9 HYPERGLYCEMIA, UNSPECIFIED: ICD-10-CM

## 2025-09-09 PROCEDURE — 99214 OFFICE O/P EST MOD 30 MIN: CPT

## 2025-09-09 PROCEDURE — 99401 PREV MED CNSL INDIV APPRX 15: CPT | Mod: 25

## 2025-09-09 PROCEDURE — 36415 COLL VENOUS BLD VENIPUNCTURE: CPT

## 2025-09-09 PROCEDURE — G2211 COMPLEX E/M VISIT ADD ON: CPT | Mod: NC

## 2025-09-11 PROBLEM — T50.905A MEDICATION ADVERSE EFFECT: Status: ACTIVE | Noted: 2025-09-09

## 2025-09-11 RX ORDER — VALACYCLOVIR 1 G/1
1 TABLET, FILM COATED ORAL
Qty: 4 | Refills: 0 | Status: ACTIVE | COMMUNITY
Start: 2025-07-21

## 2025-09-11 RX ORDER — SERDEXMETHYLPHENIDATE AND DEXMETHYLPHENIDATE 7.8; 39.2 MG/1; MG/1
39.2-7.8 CAPSULE ORAL
Qty: 30 | Refills: 0 | Status: ACTIVE | COMMUNITY
Start: 2025-05-24

## 2025-09-11 RX ORDER — FAMOTIDINE 20 MG/1
20 TABLET, FILM COATED ORAL
Qty: 90 | Refills: 0 | Status: ACTIVE | COMMUNITY
Start: 2024-11-26

## 2025-09-18 LAB
ALBUMIN SERPL ELPH-MCNC: 4.9 G/DL
ALP BLD-CCNC: 140 U/L
ALT SERPL-CCNC: 55 U/L
AMYLASE/CREAT SERPL: 69 U/L
ANION GAP SERPL CALC-SCNC: 17 MMOL/L
AST SERPL-CCNC: 44 U/L
BILIRUB SERPL-MCNC: 0.5 MG/DL
BUN SERPL-MCNC: 11 MG/DL
CALCIUM SERPL-MCNC: 10 MG/DL
CHLORIDE SERPL-SCNC: 102 MMOL/L
CHOLEST SERPL-MCNC: 201 MG/DL
CO2 SERPL-SCNC: 23 MMOL/L
CREAT SERPL-MCNC: 0.77 MG/DL
EGFRCR SERPLBLD CKD-EPI 2021: 90 ML/MIN/1.73M2
ESTIMATED AVERAGE GLUCOSE: 117 MG/DL
GLUCOSE SERPL-MCNC: 92 MG/DL
HBA1C MFR BLD HPLC: 5.7 %
HDLC SERPL-MCNC: 83 MG/DL
LDLC SERPL-MCNC: 103 MG/DL
LPL SERPL-CCNC: 43 U/L
NONHDLC SERPL-MCNC: 118 MG/DL
POTASSIUM SERPL-SCNC: 3.9 MMOL/L
PROT SERPL-MCNC: 7.9 G/DL
SODIUM SERPL-SCNC: 142 MMOL/L
TRIGL SERPL-MCNC: 87 MG/DL

## 2025-09-18 RX ORDER — TIRZEPATIDE 5 MG/.5ML
5 INJECTION, SOLUTION SUBCUTANEOUS
Qty: 2 | Refills: 0 | Status: ACTIVE | COMMUNITY
Start: 2025-09-18 | End: 1900-01-01